# Patient Record
Sex: FEMALE | Race: WHITE | NOT HISPANIC OR LATINO | Employment: FULL TIME | URBAN - NONMETROPOLITAN AREA
[De-identification: names, ages, dates, MRNs, and addresses within clinical notes are randomized per-mention and may not be internally consistent; named-entity substitution may affect disease eponyms.]

---

## 2017-01-26 ENCOUNTER — OFFICE VISIT (OUTPATIENT)
Dept: CARDIOLOGY | Facility: CLINIC | Age: 47
End: 2017-01-26

## 2017-01-26 VITALS
BODY MASS INDEX: 37.28 KG/M2 | OXYGEN SATURATION: 95 % | DIASTOLIC BLOOD PRESSURE: 97 MMHG | SYSTOLIC BLOOD PRESSURE: 156 MMHG | WEIGHT: 231 LBS | HEART RATE: 87 BPM

## 2017-01-26 DIAGNOSIS — R00.2 PALPITATIONS: ICD-10-CM

## 2017-01-26 DIAGNOSIS — R53.83 OTHER FATIGUE: ICD-10-CM

## 2017-01-26 DIAGNOSIS — G47.9 SLEEP DISORDER: ICD-10-CM

## 2017-01-26 DIAGNOSIS — R06.02 SHORTNESS OF BREATH: Primary | ICD-10-CM

## 2017-01-26 PROCEDURE — 99213 OFFICE O/P EST LOW 20 MIN: CPT | Performed by: PHYSICIAN ASSISTANT

## 2017-01-26 RX ORDER — NEBIVOLOL 5 MG/1
5 TABLET ORAL DAILY
Qty: 30 TABLET | Refills: 6 | Status: SHIPPED | OUTPATIENT
Start: 2017-01-26 | End: 2017-04-03

## 2017-01-26 NOTE — PROGRESS NOTES
Problem list     Subjective   Kirstie Jackson is a 46 y.o. female     Chief Complaint   Patient presents with   • Follow-up   • Results       HPI    Problem List:     1. Nonobstructive CAD per catheterization, 2011.  1.1 Stress test 4/6/16 - indeterminate risk due to inadequate time on the treadmill; poor exercise capacity  1.2 low risk stress test October 2016  2. Mild peripheral vascular disease per arterial duplex of the lower extremities, January 2012.  3. Hypertension  4. Dyslipidemia  5. Chronic palpitations likely secondary to anxiety and stress.  5.1 Event Monitor 10/21-11/3 - NSR  6. Smoking Habituation    Patient is a 46-year-old female that presents back for follow-up. She describes having atypical chest discomfort. She has parasternal chest discomfort that is reproducible on palpation. She describes a sharp pain. She has mild dyspnea with exertion but nothing has been progressive. She denies PND orthopnea. She does for palpitations. Last office visit she had increase metoprolol which seemed to worsen her palpitations. Otherwise, she states she is doing well.    She does complain of sleep walking. She has been very concerned about the degree of her sleep HAS HISTORY OF SLEEP DISORDER. SHE ALSO COMPLAINS OF MUCH FATIGUE BUT OTHERWISE VOICES NO COMPLAINTS      Outpatient Encounter Prescriptions as of 1/26/2017   Medication Sig Dispense Refill   • aspirin 81 MG tablet Take 1 tablet by mouth daily.     • cyanocobalamin 1000 MCG/ML injection INJECT 1 ML INTRAMUSCULARLY ONCE A MONTH     • cyclobenzaprine (FLEXERIL) 10 MG tablet Take  by mouth as needed.     • fexofenadine (ALLEGRA ALLERGY) 180 MG tablet Take 1 tablet by mouth daily.     • gabapentin (NEURONTIN) 600 MG tablet Take 300 mg by mouth at night.  0   • HYDROcodone-acetaminophen (NORCO) 5-325 MG per tablet take 1 tablet by mouth three times a day  0   • hydrOXYzine (VISTARIL) 25 MG capsule 25 mg 4 (four) times a day as needed.  0   • lisinopril  (PRINIVIL,ZESTRIL) 5 MG tablet Take 1 tablet by mouth daily.     • nitroglycerin (NITROSTAT) 0.4 MG SL tablet Place  under the tongue. PLACE 1 TABLET UNDER THE TONGUE EVERY 5 MINUTES FOR UP TO 3 DOSES AS NEEDED FOR CHEST PAIN. CALL 911 IF PAIN PERSISTS.     • pravastatin (PRAVACHOL) 10 MG tablet Take 1 tablet by mouth every night.     • ranitidine (ZANTAC) 150 MG tablet Take 2 tablets by mouth every 12 (twelve) hours.     • zafirlukast (ACCOLATE) 10 MG tablet Take 1 tablet by mouth daily.     • nebivolol (BYSTOLIC) 5 MG tablet Take 1 tablet by mouth Daily. 30 tablet 6   • [DISCONTINUED] metoprolol tartrate (LOPRESSOR) 50 MG tablet Take 1 tablet by mouth 2 (two) times a day. (Patient taking differently: Take 25 mg by mouth 2 (Two) Times a Day.) 180 tablet 3     No facility-administered encounter medications on file as of 1/26/2017.        Amoxicillin and Codeine    Past Medical History   Diagnosis Date   • Anxiety    • Asthma    • Atypical chest pain    • CAD (coronary artery disease)      2-vessel   • Chronic headaches    • Dyslipidemia    • Dyspnea    • GERD (gastroesophageal reflux disease)    • H/O cardiac catheterization 2011   • H/O: osteoarthritis    • History of peripheral arterial disease    • Hyperlipidemia    • Hypertension    • Osteoarthritis    • Palpitations    • Peripheral artery disease    • Seasonal allergies    • Vitamin B12 deficiency (non anemic)        Social History     Social History   • Marital status:      Spouse name: N/A   • Number of children: N/A   • Years of education: N/A     Occupational History   • Not on file.     Social History Main Topics   • Smoking status: Former Smoker   • Smokeless tobacco: Not on file   • Alcohol use No   • Drug use: No   • Sexual activity: Not on file     Other Topics Concern   • Not on file     Social History Narrative       Family History   Problem Relation Age of Onset   • Heart attack Father    • Other Father      CABG   • Hypertension Father    •  Heart attack Paternal Grandfather        Review of Systems   Constitutional: Positive for fatigue.   HENT: Negative.    Eyes: Negative.    Respiratory: Positive for shortness of breath.    Cardiovascular: Positive for chest pain and palpitations.   Gastrointestinal: Negative.    Endocrine: Negative.    Musculoskeletal: Negative.    Skin: Negative.    Neurological: Negative.    Hematological: Negative.    Psychiatric/Behavioral: Negative.        Objective     Visit Vitals   • /97   • Pulse 87   • Wt 231 lb (105 kg)   • SpO2 95%   • BMI 37.28 kg/m2       Lab Results (most recent)     None          Physical Exam   Constitutional: She is oriented to person, place, and time. She appears well-developed and well-nourished. No distress.   HENT:   Head: Normocephalic and atraumatic.   Eyes: EOM are normal. Pupils are equal, round, and reactive to light.   Neck: No JVD present.   Cardiovascular: Normal rate, regular rhythm and normal heart sounds.  Exam reveals no gallop and no friction rub.    No murmur heard.  Pulmonary/Chest: Effort normal and breath sounds normal. No respiratory distress. She has no wheezes. She has no rales. She exhibits no tenderness.   Abdominal: Soft.   Musculoskeletal: Normal range of motion. She exhibits no edema.   Neurological: She is alert and oriented to person, place, and time. No cranial nerve deficit.   Skin: Skin is warm and dry. No rash noted. No erythema. No pallor.   Psychiatric: She has a normal mood and affect. Her behavior is normal.   Nursing note and vitals reviewed.      Procedure   Procedures       Assessment/Plan     Problems Addressed this Visit        Cardiovascular and Mediastinum    Palpitations    Relevant Orders    Polysomnography 4 or More Parameters       Respiratory    Shortness of breath - Primary    Relevant Orders    Polysomnography 4 or More Parameters       Other    Fatigue    Relevant Orders    Polysomnography 4 or More Parameters    Sleep disorder               Recommendations  1. Would like to change her metoprolol to Bystolic to see if we can help with her tachycardia palpitations. Event monitoring his been unremarkable up to this point.  2. Because of sleep apnea and fatigue, I feel a sleep study would be appropriate. We will schedule for the same and consider referral point follow-up pending results. Nexium 3. We will see her back for follow-up after testing. Follow-up primary as scheduled

## 2017-01-26 NOTE — LETTER
January 26, 2017     Gonzalo Berg MD  [address] [facility] [address]    Patient: Kirstie Jackson   YOB: 1970   Date of Visit: 1/26/2017       Dear Dr. Otis MD:    Kirstie Jackson was in my office today. Below is a copy of my note.    If you have questions, please do not hesitate to call me. I look forward to following Kirstie along with you.         Sincerely,        ERNESTINE Wisdom        CC: No Recipients    Problem list     Subjective   Kirstie Jackson is a 46 y.o. female     Chief Complaint   Patient presents with   • Follow-up   • Results       HPI    Problem List:     1. Nonobstructive CAD per catheterization, 2011.  1.1 Stress test 4/6/16 - indeterminate risk due to inadequate time on the treadmill; poor exercise capacity  1.2 low risk stress test October 2016  2. Mild peripheral vascular disease per arterial duplex of the lower extremities, January 2012.  3. Hypertension  4. Dyslipidemia  5. Chronic palpitations likely secondary to anxiety and stress.  5.1 Event Monitor 10/21-11/3 - NSR  6. Smoking Habituation    Patient is a 46-year-old female that presents back for follow-up. She describes having atypical chest discomfort. She has parasternal chest discomfort that is reproducible on palpation. She describes a sharp pain. She has mild dyspnea with exertion but nothing has been progressive. She denies PND orthopnea. She does for palpitations. Last office visit she had increase metoprolol which seemed to worsen her palpitations. Otherwise, she states she is doing well.    She does complain of sleep walking. She has been very concerned about the degree of her sleep HAS HISTORY OF SLEEP DISORDER. SHE ALSO COMPLAINS OF MUCH FATIGUE BUT OTHERWISE VOICES NO COMPLAINTS      Outpatient Encounter Prescriptions as of 1/26/2017   Medication Sig Dispense Refill   • aspirin 81 MG tablet Take 1 tablet by mouth daily.     • cyanocobalamin 1000 MCG/ML injection INJECT 1 ML INTRAMUSCULARLY ONCE  A MONTH     • cyclobenzaprine (FLEXERIL) 10 MG tablet Take  by mouth as needed.     • fexofenadine (ALLEGRA ALLERGY) 180 MG tablet Take 1 tablet by mouth daily.     • gabapentin (NEURONTIN) 600 MG tablet Take 300 mg by mouth at night.  0   • HYDROcodone-acetaminophen (NORCO) 5-325 MG per tablet take 1 tablet by mouth three times a day  0   • hydrOXYzine (VISTARIL) 25 MG capsule 25 mg 4 (four) times a day as needed.  0   • lisinopril (PRINIVIL,ZESTRIL) 5 MG tablet Take 1 tablet by mouth daily.     • nitroglycerin (NITROSTAT) 0.4 MG SL tablet Place  under the tongue. PLACE 1 TABLET UNDER THE TONGUE EVERY 5 MINUTES FOR UP TO 3 DOSES AS NEEDED FOR CHEST PAIN. CALL 911 IF PAIN PERSISTS.     • pravastatin (PRAVACHOL) 10 MG tablet Take 1 tablet by mouth every night.     • ranitidine (ZANTAC) 150 MG tablet Take 2 tablets by mouth every 12 (twelve) hours.     • zafirlukast (ACCOLATE) 10 MG tablet Take 1 tablet by mouth daily.     • nebivolol (BYSTOLIC) 5 MG tablet Take 1 tablet by mouth Daily. 30 tablet 6   • [DISCONTINUED] metoprolol tartrate (LOPRESSOR) 50 MG tablet Take 1 tablet by mouth 2 (two) times a day. (Patient taking differently: Take 25 mg by mouth 2 (Two) Times a Day.) 180 tablet 3     No facility-administered encounter medications on file as of 1/26/2017.        Amoxicillin and Codeine    Past Medical History   Diagnosis Date   • Anxiety    • Asthma    • Atypical chest pain    • CAD (coronary artery disease)      2-vessel   • Chronic headaches    • Dyslipidemia    • Dyspnea    • GERD (gastroesophageal reflux disease)    • H/O cardiac catheterization 2011   • H/O: osteoarthritis    • History of peripheral arterial disease    • Hyperlipidemia    • Hypertension    • Osteoarthritis    • Palpitations    • Peripheral artery disease    • Seasonal allergies    • Vitamin B12 deficiency (non anemic)        Social History     Social History   • Marital status:      Spouse name: N/A   • Number of children: N/A   •  Years of education: N/A     Occupational History   • Not on file.     Social History Main Topics   • Smoking status: Former Smoker   • Smokeless tobacco: Not on file   • Alcohol use No   • Drug use: No   • Sexual activity: Not on file     Other Topics Concern   • Not on file     Social History Narrative       Family History   Problem Relation Age of Onset   • Heart attack Father    • Other Father      CABG   • Hypertension Father    • Heart attack Paternal Grandfather        Review of Systems   Constitutional: Positive for fatigue.   HENT: Negative.    Eyes: Negative.    Respiratory: Positive for shortness of breath.    Cardiovascular: Positive for chest pain and palpitations.   Gastrointestinal: Negative.    Endocrine: Negative.    Musculoskeletal: Negative.    Skin: Negative.    Neurological: Negative.    Hematological: Negative.    Psychiatric/Behavioral: Negative.        Objective     Visit Vitals   • /97   • Pulse 87   • Wt 231 lb (105 kg)   • SpO2 95%   • BMI 37.28 kg/m2       Lab Results (most recent)     None          Physical Exam   Constitutional: She is oriented to person, place, and time. She appears well-developed and well-nourished. No distress.   HENT:   Head: Normocephalic and atraumatic.   Eyes: EOM are normal. Pupils are equal, round, and reactive to light.   Neck: No JVD present.   Cardiovascular: Normal rate, regular rhythm and normal heart sounds.  Exam reveals no gallop and no friction rub.    No murmur heard.  Pulmonary/Chest: Effort normal and breath sounds normal. No respiratory distress. She has no wheezes. She has no rales. She exhibits no tenderness.   Abdominal: Soft.   Musculoskeletal: Normal range of motion. She exhibits no edema.   Neurological: She is alert and oriented to person, place, and time. No cranial nerve deficit.   Skin: Skin is warm and dry. No rash noted. No erythema. No pallor.   Psychiatric: She has a normal mood and affect. Her behavior is normal.   Nursing note  and vitals reviewed.      Procedure   Procedures       Assessment/Plan     Problems Addressed this Visit        Cardiovascular and Mediastinum    Palpitations    Relevant Orders    Polysomnography 4 or More Parameters       Respiratory    Shortness of breath - Primary    Relevant Orders    Polysomnography 4 or More Parameters       Other    Fatigue    Relevant Orders    Polysomnography 4 or More Parameters    Sleep disorder              Recommendations  1. Would like to change her metoprolol to Bystolic to see if we can help with her tachycardia palpitations. Event monitoring his been unremarkable up to this point.  2. Because of sleep apnea and fatigue, I feel a sleep study would be appropriate. We will schedule for the same and consider referral point follow-up pending results. Nexium 3. We will see her back for follow-up after testing. Follow-up primary as scheduled

## 2017-01-26 NOTE — PATIENT INSTRUCTIONS
Sleep Studies  A sleep study (polysomnogram) is a series of tests done while you are sleeping. It can show how well you sleep. This can help your health care provider diagnose a sleep disorder and show how severe your sleep disorder is. A sleep study may lead to treatment that will help you sleep better and prevent other medical problems caused by poor sleep.  If you have a sleep disorder, you may also be at risk for:   · Sleep-related accidents.  · High blood pressure.  · Heart disease.  · Stroke.  · Other medical conditions.  Sleep disorders are common. Your health care provider may suspect a sleep disorder if you:  · Have loud snoring most nights.  · Have brief periods when you stop breathing at night.  · Feel sleepy on most days.  · Fall asleep suddenly during the day.  · Have trouble falling asleep or staying asleep.  · Feel like you need to move your legs when trying to fall asleep.  · Have dreams that seem very real shortly after falling asleep.  · Feel like you cannot move when you first wake up.  WHICH TESTS WILL I NEED TO HAVE?   Most sleep studies last all night and include these tests:   · Recordings of your brain activity.  · Recordings of your eye movements.  · Recording of your heart rate and rhythm.  · Blood pressure readings.  · Readings of the amount of oxygen in your blood.  · Measurements of your chest and belly movement as you breathe during sleep.  If you have signs of the sleep disorder called sleep apnea during your test, you may get a mask to wear for the second half of the night.   · The mask provides continuous positive airway pressure (CPAP). This may improve sleep apnea significantly.  · You will then have all tests done again with the mask in place to see if your measurements and recordings change.  HOW ARE SLEEP STUDIES DONE?  Most sleep studies are done over one full night of sleep.   · You will arrive at the study center in the evening and can go home in the morning.  · Bring your  pajamas and toothbrush.  · Do not have caffeine on the day of your sleep study.  · Your health care provider will let you know if you need to stop taking any of your regular medicines before the test.  To do the tests included in a polysomnogram, you will have:  · Round, sticky patches with sensors attached to recording wires (electrodes) placed on your scalp, face, chest, and limbs.  · Wires from all the electrodes and sensors run from your bed to a computer. The wires can be taken off and put back on if you need to get out of bed to go to the bathroom.  · A sensor placed over your nose to measure airflow.  · A finger clip put on one finger to measure your blood oxygen level.  · A belt around your belly and a belt around your chest to measure breathing movements.  WHERE ARE SLEEP STUDIES DONE?   Sleep studies are done at sleep centers. A sleep center may be inside a hospital, office, or clinic.   The room where you have the study may look like a hospital room or a hotel room. The health care providers doing the study may come in and out of the room during the study. Most of the time, they will be in another room monitoring your test.   HOW IS INFORMATION FROM SLEEP STUDIES HELPFUL?  A polysomnogram can be used along with your medical history and a physical exam to diagnose conditions, such as:  · Sleep apnea.  · Restless legs syndrome.  · Sleep-related seizure disorders.  · Sleep-related movement disorders.  A medical doctor who specializes in sleep will evaluate your sleep study. The specialist will share the results with your primary health care provider. Treatments based on your sleep study may include:  · Improving your sleep habits (sleep hygiene).  · Wearing a CPAP mask.  · Wearing an oral device at night to improve breathing and reduce snoring.  · Taking medicine for:    Restless legs syndrome.    Sleep-related seizure disorder.    Sleep-related movement disorder.     This information is not intended to  replace advice given to you by your health care provider. Make sure you discuss any questions you have with your health care provider.     Document Released: 06/23/2004 Document Revised: 01/08/2016 Document Reviewed: 02/23/2015  [employer] Patient Education ©2016 [employer]

## 2017-01-26 NOTE — MR AVS SNAPSHOT
Kirstie Jackson   1/26/2017 9:00 AM   Office Visit    Dept Phone:  [phone]   Encounter #:  [id number]    Provider:  ERNESTINE Middleton   Department:  [facility] CARDIOLOGY                Your Full Care Plan              Today's Medication Changes          These changes are accurate as of: 1/26/17  9:34 AM.  If you have any questions, ask your nurse or doctor.               New Medication(s)Ordered:     nebivolol 5 MG tablet   Commonly known as:  BYSTOLIC   Take 1 tablet by mouth Daily.   Started by:  ERNESTINE Middleton         Stop taking medication(s)listed here:     metoprolol tartrate 50 MG tablet   Commonly known as:  LOPRESSOR   Stopped by:  ERNESTINE Middleton                Where to Get Your Medications      These medications were sent to [facility]/pharmacy #[id number] - [place], [place] - [address] [address] - [phone]  - [phone]   [address] [address]     Phone:  [phone]     nebivolol 5 MG tablet                  Your Updated Medication List          This list is accurate as of: 1/26/17  9:34 AM.  Always use your most recent med list.                ACCOLATE 10 MG tablet   Generic drug:  zafirlukast       ALLEGRA ALLERGY 180 MG tablet   Generic drug:  fexofenadine       aspirin 81 MG tablet       cyanocobalamin 1000 MCG/ML injection       FLEXERIL 10 MG tablet   Generic drug:  cyclobenzaprine       gabapentin 600 MG tablet   Commonly known as:  NEURONTIN       HYDROcodone-acetaminophen 5-325 MG per tablet   Commonly known as:  NORCO       hydrOXYzine 25 MG capsule   Commonly known as:  VISTARIL       lisinopril 5 MG tablet   Commonly known as:  PRINIVIL,ZESTRIL       nebivolol 5 MG tablet   Commonly known as:  BYSTOLIC   Take 1 tablet by mouth Daily.       NITROSTAT 0.4 MG SL tablet   Generic drug:  nitroglycerin       pravastatin 10 MG tablet   Commonly known as:  PRAVACHOL       raNITIdine 150 MG tablet   Commonly known as:  ZANTAC                You Were Diagnosed With        Codes Comments    Shortness of breath    -  Primary ICD-10-CM: R06.02  ICD-9-CM: 786.05     Palpitations     ICD-10-CM: R00.2  ICD-9-CM: 785.1     Other fatigue     ICD-10-CM: R53.83  ICD-9-CM: 780.79       Instructions    Sleep Studies  A sleep study (polysomnogram) is a series of tests done while you are sleeping. It can show how well you sleep. This can help your health care provider diagnose a sleep disorder and show how severe your sleep disorder is. A sleep study may lead to treatment that will help you sleep better and prevent other medical problems caused by poor sleep.  If you have a sleep disorder, you may also be at risk for:   · Sleep-related accidents.  · High blood pressure.  · Heart disease.  · Stroke.  · Other medical conditions.  Sleep disorders are common. Your health care provider may suspect a sleep disorder if you:  · Have loud snoring most nights.  · Have brief periods when you stop breathing at night.  · Feel sleepy on most days.  · Fall asleep suddenly during the day.  · Have trouble falling asleep or staying asleep.  · Feel like you need to move your legs when trying to fall asleep.  · Have dreams that seem very real shortly after falling asleep.  · Feel like you cannot move when you first wake up.  WHICH TESTS WILL I NEED TO HAVE?   Most sleep studies last all night and include these tests:   · Recordings of your brain activity.  · Recordings of your eye movements.  · Recording of your heart rate and rhythm.  · Blood pressure readings.  · Readings of the amount of oxygen in your blood.  · Measurements of your chest and belly movement as you breathe during sleep.  If you have signs of the sleep disorder called sleep apnea during your test, you may get a mask to wear for the second half of the night.   · The mask provides continuous positive airway pressure (CPAP). This may improve sleep apnea significantly.  · You will then have all tests done  again with the mask in place to see if your measurements and recordings change.  HOW ARE SLEEP STUDIES DONE?  Most sleep studies are done over one full night of sleep.   · You will arrive at the study center in the evening and can go home in the morning.  · Bring your pajamas and toothbrush.  · Do not have caffeine on the day of your sleep study.  · Your health care provider will let you know if you need to stop taking any of your regular medicines before the test.  To do the tests included in a polysomnogram, you will have:  · Round, sticky patches with sensors attached to recording wires (electrodes) placed on your scalp, face, chest, and limbs.  · Wires from all the electrodes and sensors run from your bed to a computer. The wires can be taken off and put back on if you need to get out of bed to go to the bathroom.  · A sensor placed over your nose to measure airflow.  · A finger clip put on one finger to measure your blood oxygen level.  · A belt around your belly and a belt around your chest to measure breathing movements.  WHERE ARE SLEEP STUDIES DONE?   Sleep studies are done at sleep centers. A sleep center may be inside a hospital, office, or clinic.   The room where you have the study may look like a hospital room or a hotel room. The health care providers doing the study may come in and out of the room during the study. Most of the time, they will be in another room monitoring your test.   HOW IS INFORMATION FROM SLEEP STUDIES HELPFUL?  A polysomnogram can be used along with your medical history and a physical exam to diagnose conditions, such as:  · Sleep apnea.  · Restless legs syndrome.  · Sleep-related seizure disorders.  · Sleep-related movement disorders.  A medical doctor who specializes in sleep will evaluate your sleep study. The specialist will share the results with your primary health care provider. Treatments based on your sleep study may include:  · Improving your sleep habits (sleep  hygiene).  · Wearing a CPAP mask.  · Wearing an oral device at night to improve breathing and reduce snoring.  · Taking medicine for:    Restless legs syndrome.    Sleep-related seizure disorder.    Sleep-related movement disorder.     This information is not intended to replace advice given to you by your health care provider. Make sure you discuss any questions you have with your health care provider.     Document Released: 2004 Document Revised: 2016 Document Reviewed: 2015  Elsevier Interactive Patient Education  [employer]       Patient Instructions History      Upcoming Appointments     Visit Type Date Time Department    FOLLOW UP 2017  9:00 AM [facility]      "[employer] Signup     [facility] "[employer]" allows you to send messages to your doctor, view your test results, renew your prescriptions, schedule appointments, and more. To sign up, go to [employer] and click on the Sign Up Now link in the New User? box. Enter your "[employer]" Activation Code exactly as it appears below along with the last four digits of your Social Security Number and your Date of Birth () to complete the sign-up process. If you do not sign up before the expiration date, you must request a new code.    "[employer]" Activation Code: [email or web address]  Expires: 2017  9:34 AM    If you have questions, you can email [email or web address] or call [phone] to talk to our "[employer]" staff. Remember, "[employer]" is NOT to be used for urgent needs. For medical emergencies, dial 911.               Other Info from Your Visit           Allergies     Amoxicillin      Codeine        Reason for Visit     Follow-up     Results           Vital Signs     Blood Pressure Pulse Weight Oxygen Saturation Body Mass Index Smoking Status    156/97 87 231 lb (105 kg) 95% 37.28 kg/m2 Former Smoker      Problems and Diagnoses Noted     Tiredness    Palpitations    Shortness of breath

## 2017-04-03 ENCOUNTER — OFFICE VISIT (OUTPATIENT)
Dept: CARDIOLOGY | Facility: CLINIC | Age: 47
End: 2017-04-03

## 2017-04-03 VITALS
OXYGEN SATURATION: 95 % | DIASTOLIC BLOOD PRESSURE: 89 MMHG | WEIGHT: 234 LBS | HEART RATE: 68 BPM | SYSTOLIC BLOOD PRESSURE: 138 MMHG | HEIGHT: 66 IN | BODY MASS INDEX: 37.61 KG/M2

## 2017-04-03 DIAGNOSIS — R06.00 DYSPNEA, UNSPECIFIED TYPE: Primary | ICD-10-CM

## 2017-04-03 DIAGNOSIS — R06.81 APNEA: ICD-10-CM

## 2017-04-03 DIAGNOSIS — I10 ESSENTIAL HYPERTENSION: ICD-10-CM

## 2017-04-03 DIAGNOSIS — R53.83 OTHER FATIGUE: ICD-10-CM

## 2017-04-03 DIAGNOSIS — R07.9 CHEST PAIN, UNSPECIFIED TYPE: ICD-10-CM

## 2017-04-03 PROCEDURE — 99213 OFFICE O/P EST LOW 20 MIN: CPT | Performed by: PHYSICIAN ASSISTANT

## 2017-04-03 RX ORDER — ZAFIRLUKAST 20 MG/1
TABLET, FILM COATED ORAL 2 TIMES DAILY
Refills: 0 | COMMUNITY
Start: 2017-03-21 | End: 2019-06-10

## 2017-04-03 RX ORDER — HYDROCODONE BITARTRATE AND ACETAMINOPHEN 10; 325 MG/1; MG/1
TABLET ORAL
COMMUNITY
Start: 2017-03-31

## 2017-04-03 RX ORDER — RANITIDINE 300 MG/1
TABLET ORAL 2 TIMES DAILY
Refills: 0 | COMMUNITY
Start: 2017-03-22 | End: 2020-06-11

## 2017-04-03 RX ORDER — METOPROLOL TARTRATE 50 MG/1
TABLET, FILM COATED ORAL 2 TIMES DAILY
Refills: 3 | COMMUNITY
Start: 2017-02-26 | End: 2017-08-21 | Stop reason: SDUPTHER

## 2017-04-03 RX ORDER — PRAVASTATIN SODIUM 20 MG
20 TABLET ORAL DAILY
Qty: 30 TABLET | Refills: 11 | Status: SHIPPED | OUTPATIENT
Start: 2017-04-03 | End: 2017-05-30 | Stop reason: SDUPTHER

## 2017-04-03 NOTE — PROGRESS NOTES
Problem list     Subjective   Kirstie Jackson is a 46 y.o. female     Chief Complaint   Patient presents with   • Follow-up       HPI    Problem List:      1. Nonobstructive CAD per catheterization, 2011.  1.1 Stress test 4/6/16 - indeterminate risk due to inadequate time on the treadmill; poor exercise capacity  1.2 low risk stress test October 2016  2. Mild peripheral vascular disease per arterial duplex of the lower extremities, January 2012.  3. Hypertension  4. Dyslipidemia  5. Chronic palpitations likely secondary to anxiety and stress.  5.1 Event Monitor 10/21-11/3 - NSR  6. Smoking Habituation    Patient is a 46-year-old female that presents back for follow-up. She states she has been doing well. Her chest discomfort his prior with no progression of her discomfort. It is transient in nature described as a few seconds and then resolving. She has mild dyspnea at baseline but this has not progressed. She denies PND orthopnea. Palpitations are stable on metoprolol as she could not take by systolic. She has no dizziness presyncope or syncope and otherwise feels well      Outpatient Encounter Prescriptions as of 4/3/2017   Medication Sig Dispense Refill   • cyanocobalamin 1000 MCG/ML injection INJECT 1 ML INTRAMUSCULARLY ONCE A MONTH     • cyclobenzaprine (FLEXERIL) 10 MG tablet Take  by mouth as needed.     • fexofenadine (ALLEGRA ALLERGY) 180 MG tablet Take 1 tablet by mouth daily.     • gabapentin (NEURONTIN) 600 MG tablet Take 300 mg by mouth at night.  0   • HYDROcodone-acetaminophen (NORCO) 7.5-325 MG per tablet prn     • hydrOXYzine (VISTARIL) 25 MG capsule 25 mg 4 (four) times a day as needed.  0   • metoprolol tartrate (LOPRESSOR) 50 MG tablet 2 (Two) Times a Day.  3   • raNITIdine (ZANTAC) 300 MG tablet 2 (Two) Times a Day.  0   • zafirlukast (ACCOLATE) 20 MG tablet 2 (Two) Times a Day.  0   • [DISCONTINUED] aspirin 81 MG tablet Take 1 tablet by mouth daily.     • [DISCONTINUED] lisinopril  (PRINIVIL,ZESTRIL) 5 MG tablet Take 1 tablet by mouth daily.     • [DISCONTINUED] zafirlukast (ACCOLATE) 10 MG tablet Take 1 tablet by mouth daily.     • nitroglycerin (NITROSTAT) 0.4 MG SL tablet Place  under the tongue. PLACE 1 TABLET UNDER THE TONGUE EVERY 5 MINUTES FOR UP TO 3 DOSES AS NEEDED FOR CHEST PAIN. CALL 911 IF PAIN PERSISTS.     • pravastatin (PRAVACHOL) 20 MG tablet Take 1 tablet by mouth Daily. 30 tablet 11   • [DISCONTINUED] HYDROcodone-acetaminophen (NORCO) 5-325 MG per tablet take 1 tablet by mouth three times a day  0   • [DISCONTINUED] nebivolol (BYSTOLIC) 5 MG tablet Take 1 tablet by mouth Daily. 30 tablet 6   • [DISCONTINUED] pravastatin (PRAVACHOL) 10 MG tablet Take 1 tablet by mouth every night.     • [DISCONTINUED] ranitidine (ZANTAC) 150 MG tablet Take 2 tablets by mouth every 12 (twelve) hours.       No facility-administered encounter medications on file as of 4/3/2017.        Amoxicillin and Codeine    Past Medical History:   Diagnosis Date   • Anxiety    • Asthma    • Atypical chest pain    • CAD (coronary artery disease)     2-vessel   • Chronic headaches    • Dyslipidemia    • Dyspnea    • GERD (gastroesophageal reflux disease)    • H/O cardiac catheterization 2011   • H/O: osteoarthritis    • History of peripheral arterial disease    • Hyperlipidemia    • Hypertension    • Osteoarthritis    • Palpitations    • Peripheral artery disease    • Seasonal allergies    • Vitamin B12 deficiency (non anemic)        Social History     Social History   • Marital status:      Spouse name: N/A   • Number of children: N/A   • Years of education: N/A     Occupational History   • Not on file.     Social History Main Topics   • Smoking status: Former Smoker   • Smokeless tobacco: Not on file   • Alcohol use No   • Drug use: No   • Sexual activity: Not on file     Other Topics Concern   • Not on file     Social History Narrative       Family History   Problem Relation Age of Onset   • Heart  "attack Father    • Other Father      CABG   • Hypertension Father    • Heart attack Paternal Grandfather        Review of Systems   Constitutional: Positive for fatigue.   HENT: Positive for congestion (nasal).    Eyes: Positive for visual disturbance (reading glasses).   Respiratory: Positive for shortness of breath (usual).    Cardiovascular: Positive for chest pain, palpitations and leg swelling (occas.).   Gastrointestinal: Negative.    Endocrine: Negative.    Genitourinary: Negative.    Musculoskeletal: Positive for arthralgias and myalgias.   Skin: Negative.    Allergic/Immunologic: Positive for environmental allergies (seasonal).   Neurological: Positive for dizziness and light-headedness.   Hematological: Negative.    Psychiatric/Behavioral: Positive for sleep disturbance.       Objective     /89 (BP Location: Left arm, Patient Position: Sitting)  Pulse 68  Ht 66\" (167.6 cm)  Wt 234 lb (106 kg)  SpO2 95%  BMI 37.77 kg/m2    Lab Results (most recent)     None          Physical Exam   Constitutional: She is oriented to person, place, and time. She appears well-developed and well-nourished. No distress.   HENT:   Head: Normocephalic and atraumatic.   Eyes: EOM are normal. Pupils are equal, round, and reactive to light.   Neck: No JVD present.   Cardiovascular: Normal rate, regular rhythm and normal heart sounds.  Exam reveals no gallop and no friction rub.    No murmur heard.  Pulmonary/Chest: Effort normal and breath sounds normal. No respiratory distress. She has no wheezes. She has no rales. She exhibits no tenderness.   Abdominal: Soft. She exhibits no distension. There is no tenderness.   Musculoskeletal: Normal range of motion. She exhibits no edema.   Neurological: She is alert and oriented to person, place, and time. No cranial nerve deficit.   Skin: Skin is warm and dry. No rash noted. No erythema. No pallor.   Psychiatric: She has a normal mood and affect. Her behavior is normal.   Nursing " note and vitals reviewed.      Procedure   Procedures       Assessment/Plan     Problems Addressed this Visit        Cardiovascular and Mediastinum    Essential hypertension    Relevant Medications    metoprolol tartrate (LOPRESSOR) 50 MG tablet    Other Relevant Orders    Overnight Sleep Oximetry Study       Respiratory    Dyspnea - Primary    Relevant Orders    Overnight Sleep Oximetry Study    Apnea    Relevant Orders    Overnight Sleep Oximetry Study       Nervous and Auditory    Chest pain    Relevant Orders    Overnight Sleep Oximetry Study       Other    Fatigue    Relevant Orders    Overnight Sleep Oximetry Study            Recommendations  1. Because of patient's fatigue and shortness of breath, would like to schedule for overnight sleep oximetry study. Otherwise chest pain is very atypical.    Palpitations are controlled on current medical therapy. Recent evaluation of regards to ischemia is been normal. She wants no further evaluation. If symptoms worsen she will contact our office. We will see her back follow-up in 6 months. Follow-up with primary as scheduled

## 2017-05-30 RX ORDER — PRAVASTATIN SODIUM 20 MG
20 TABLET ORAL DAILY
Qty: 90 TABLET | Refills: 3 | Status: SHIPPED | OUTPATIENT
Start: 2017-05-30 | End: 2020-06-11

## 2017-08-21 RX ORDER — METOPROLOL TARTRATE 50 MG/1
50 TABLET, FILM COATED ORAL 2 TIMES DAILY
Qty: 180 TABLET | Refills: 3 | Status: SHIPPED | OUTPATIENT
Start: 2017-08-21 | End: 2018-08-17 | Stop reason: SDUPTHER

## 2018-03-20 ENCOUNTER — APPOINTMENT (OUTPATIENT)
Dept: WOMENS IMAGING | Facility: HOSPITAL | Age: 48
End: 2018-03-20

## 2018-03-20 PROCEDURE — 77067 SCR MAMMO BI INCL CAD: CPT | Performed by: RADIOLOGY

## 2018-03-20 PROCEDURE — 77063 BREAST TOMOSYNTHESIS BI: CPT | Performed by: RADIOLOGY

## 2018-08-17 RX ORDER — METOPROLOL TARTRATE 50 MG/1
50 TABLET, FILM COATED ORAL EVERY 12 HOURS SCHEDULED
Qty: 60 TABLET | Refills: 0 | Status: SHIPPED | OUTPATIENT
Start: 2018-08-17 | End: 2018-08-20 | Stop reason: SDUPTHER

## 2018-08-20 DIAGNOSIS — I10 ESSENTIAL HYPERTENSION: Primary | ICD-10-CM

## 2018-08-20 RX ORDER — METOPROLOL TARTRATE 50 MG/1
50 TABLET, FILM COATED ORAL EVERY 12 HOURS SCHEDULED
Qty: 60 TABLET | Refills: 0 | Status: SHIPPED | OUTPATIENT
Start: 2018-08-20 | End: 2018-09-19 | Stop reason: SDUPTHER

## 2018-09-19 DIAGNOSIS — I10 ESSENTIAL HYPERTENSION: ICD-10-CM

## 2018-09-19 RX ORDER — METOPROLOL TARTRATE 50 MG/1
50 TABLET, FILM COATED ORAL EVERY 12 HOURS SCHEDULED
Qty: 60 TABLET | Refills: 0 | Status: SHIPPED | OUTPATIENT
Start: 2018-09-19 | End: 2018-09-19 | Stop reason: SDUPTHER

## 2018-09-19 RX ORDER — METOPROLOL TARTRATE 50 MG/1
50 TABLET, FILM COATED ORAL EVERY 12 HOURS SCHEDULED
Qty: 180 TABLET | Refills: 2 | Status: SHIPPED | OUTPATIENT
Start: 2018-09-19

## 2018-09-19 NOTE — TELEPHONE ENCOUNTER
Advised patient 1 month refill to be sent in however she would have to be seen in office for any future refills as she hasn't been seen since 4/2017. Patient scheduled 10/8/18. FARHAT Conteh

## 2019-06-10 ENCOUNTER — OFFICE VISIT (OUTPATIENT)
Dept: CARDIOLOGY | Facility: CLINIC | Age: 49
End: 2019-06-10

## 2019-06-10 VITALS
SYSTOLIC BLOOD PRESSURE: 143 MMHG | BODY MASS INDEX: 35.36 KG/M2 | DIASTOLIC BLOOD PRESSURE: 77 MMHG | OXYGEN SATURATION: 96 % | HEIGHT: 66 IN | WEIGHT: 220 LBS | HEART RATE: 70 BPM

## 2019-06-10 DIAGNOSIS — R07.9 CHEST PAIN, UNSPECIFIED TYPE: ICD-10-CM

## 2019-06-10 DIAGNOSIS — R00.2 PALPITATIONS: Primary | ICD-10-CM

## 2019-06-10 DIAGNOSIS — R06.02 SHORTNESS OF BREATH: ICD-10-CM

## 2019-06-10 PROCEDURE — 93000 ELECTROCARDIOGRAM COMPLETE: CPT | Performed by: PHYSICIAN ASSISTANT

## 2019-06-10 PROCEDURE — 99213 OFFICE O/P EST LOW 20 MIN: CPT | Performed by: PHYSICIAN ASSISTANT

## 2019-06-10 RX ORDER — FOLIC ACID 1 MG/1
1 TABLET ORAL 2 TIMES DAILY
COMMUNITY
End: 2020-06-11

## 2019-06-10 RX ORDER — TIZANIDINE 4 MG/1
4 TABLET ORAL NIGHTLY PRN
COMMUNITY
End: 2020-06-11

## 2019-06-10 RX ORDER — MONTELUKAST SODIUM 10 MG/1
10 TABLET ORAL NIGHTLY
COMMUNITY

## 2019-06-10 NOTE — PATIENT INSTRUCTIONS
Steps to Quit Smoking  Smoking tobacco can be bad for your health. It can also affect almost every organ in your body. Smoking puts you and people around you at risk for many serious long-lasting (chronic) diseases. Quitting smoking is hard, but it is one of the best things that you can do for your health. It is never too late to quit.  What are the benefits of quitting smoking?  When you quit smoking, you lower your risk for getting serious diseases and conditions. They can include:  · Lung cancer or lung disease.  · Heart disease.  · Stroke.  · Heart attack.  · Not being able to have children (infertility).  · Weak bones (osteoporosis) and broken bones (fractures).    If you have coughing, wheezing, and shortness of breath, those symptoms may get better when you quit. You may also get sick less often. If you are pregnant, quitting smoking can help to lower your chances of having a baby of low birth weight.  What can I do to help me quit smoking?  Talk with your doctor about what can help you quit smoking. Some things you can do (strategies) include:  · Quitting smoking totally, instead of slowly cutting back how much you smoke over a period of time.  · Going to in-person counseling. You are more likely to quit if you go to many counseling sessions.  · Using resources and support systems, such as:  ? Online chats with a counselor.  ? Phone quitlines.  ? Printed self-help materials.  ? Support groups or group counseling.  ? Text messaging programs.  ? Mobile phone apps or applications.  · Taking medicines. Some of these medicines may have nicotine in them. If you are pregnant or breastfeeding, do not take any medicines to quit smoking unless your doctor says it is okay. Talk with your doctor about counseling or other things that can help you.    Talk with your doctor about using more than one strategy at the same time, such as taking medicines while you are also going to in-person counseling. This can help make  quitting easier.  What things can I do to make it easier to quit?  Quitting smoking might feel very hard at first, but there is a lot that you can do to make it easier. Take these steps:  · Talk to your family and friends. Ask them to support and encourage you.  · Call phone quitlines, reach out to support groups, or work with a counselor.  · Ask people who smoke to not smoke around you.  · Avoid places that make you want (trigger) to smoke, such as:  ? Bars.  ? Parties.  ? Smoke-break areas at work.  · Spend time with people who do not smoke.  · Lower the stress in your life. Stress can make you want to smoke. Try these things to help your stress:  ? Getting regular exercise.  ? Deep-breathing exercises.  ? Yoga.  ? Meditating.  ? Doing a body scan. To do this, close your eyes, focus on one area of your body at a time from head to toe, and notice which parts of your body are tense. Try to relax the muscles in those areas.  · Download or buy apps on your mobile phone or tablet that can help you stick to your quit plan. There are many free apps, such as QuitGuide from the CDC (Centers for Disease Control and Prevention). You can find more support from smokefree.gov and other websites.    This information is not intended to replace advice given to you by your health care provider. Make sure you discuss any questions you have with your health care provider.  Document Released: 10/14/2010 Document Revised: 08/15/2017 Document Reviewed: 05/03/2016  [employer] Interactive Patient Education © 2019 [employer]  Fat and Cholesterol Restricted Eating Plan  Getting too much fat and cholesterol in your diet may cause health problems. Choosing the right foods helps keep your fat and cholesterol at normal levels. This can keep you from getting certain diseases.  Your doctor may recommend an eating plan that includes:  · Total fat: ______% or less of total calories a day.  · Saturated fat: ______% or less of total calories a  "day.  · Cholesterol: less than _________mg a day.  · Fiber: ______g a day.    What are tips for following this plan?  General tips  · Work with your doctor to lose weight if you need to.  · Avoid:  ? Foods with added sugar.  ? Fried foods.  ? Foods with partially hydrogenated oils.  · Limit alcohol intake to no more than 1 drink a day for nonpregnant women and 2 drinks a day for men. One drink equals 12 oz of beer, 5 oz of wine, or 1½ oz of hard liquor.  Reading food labels  · Check food labels for:  ? Trans fats.  ? Partially hydrogenated oils.  ? Saturated fat (g) in each serving.  ? Cholesterol (mg) in each serving.  ? Fiber (g) in each serving.  · Choose foods with healthy fats, such as:  ? Monounsaturated fats.  ? Polyunsaturated fats.  ? Omega-3 fats.  · Choose grain products that have whole grains. Look for the word \"whole\" as the first word in the ingredient list.  Cooking  · Cook foods using low-fat methods. These include baking, boiling, grilling, and broiling.  · Eat more home-cooked foods. Eat at restaurants and buffets less often.  · Avoid cooking using saturated fats, such as butter, cream, palm oil, palm kernel oil, and coconut oil.  Meal planning  · At meals, divide your plate into four equal parts:  ? Fill one-half of your plate with vegetables and green salads.  ? Fill one-fourth of your plate with whole grains.  ? Fill one-fourth of your plate with low-fat (lean) protein foods.  · Eat fish that is high in omega-3 fats at least two times a week. This includes mackerel, tuna, sardines, and salmon.  · Eat foods that are high in fiber, such as whole grains, beans, apples, broccoli, carrots, peas, and barley.  Recommended foods  Grains  · Whole grains, such as whole wheat or whole grain breads, crackers, cereals, and pasta. Unsweetened oatmeal, bulgur, barley, quinoa, or brown rice. Corn or whole wheat flour tortillas.  Vegetables  · Fresh or frozen vegetables (raw, steamed, roasted, or grilled). Green " salads.  Fruits  · All fresh, canned (in natural juice), or frozen fruits.  Meats and other protein foods  · Ground beef (85% or leaner), grass-fed beef, or beef trimmed of fat. Skinless chicken or turkey. Ground chicken or turkey. Pork trimmed of fat. All fish and seafood. Egg whites. Dried beans, peas, or lentils. Unsalted nuts or seeds. Unsalted canned beans. Nut butters without added sugar or oil.  Dairy  · Low-fat or nonfat dairy products, such as skim or 1% milk, 2% or reduced-fat cheeses, low-fat and fat-free ricotta or cottage cheese, or plain low-fat and nonfat yogurt.  Fats and oils  · Tub margarine without trans fats. Light or reduced-fat mayonnaise and salad dressings. Avocado. Olive, canola, sesame, or safflower oils.  The items listed above may not be a complete list of recommended foods or beverages. Contact your dietitian for more options.  Foods to avoid  Grains  · White bread. White pasta. White rice. Cornbread. Bagels, pastries, and croissants. Crackers and snack foods that contain trans fat and hydrogenated oils.  Vegetables  · Vegetables cooked in cheese, cream, or butter sauce. Fried vegetables.  Fruits  · Canned fruit in heavy syrup. Fruit in cream or butter sauce. Fried fruit.  Meats and other protein foods  · Fatty cuts of meat. Ribs, chicken wings, jeffrey, sausage, bologna, salami, chitterlings, fatback, hot dogs, bratwurst, and packaged lunch meats. Liver and organ meats. Whole eggs and egg yolks. Chicken and turkey with skin. Fried meat.  Dairy  · Whole or 2% milk, cream, half-and-half, and cream cheese. Whole milk cheeses. Whole-fat or sweetened yogurt. Full-fat cheeses. Nondairy creamers and whipped toppings. Processed cheese, cheese spreads, and cheese curds.  Beverages  · Alcohol. Sugar-sweetened drinks such as sodas, lemonade, and fruit drinks.  Fats and oils  · Butter, stick margarine, lard, shortening, ghee, or jeffrey fat. Coconut, palm kernel, and palm oils.  Sweets and  desserts  · Corn syrup, sugars, honey, and molasses. Candy. Jam and jelly. Syrup. Sweetened cereals. Cookies, pies, cakes, donuts, muffins, and ice cream.  The items listed above may not be a complete list of foods and beverages to avoid. Contact your dietitian for more information.  Summary  · Choosing the right foods helps keep your fat and cholesterol at normal levels. This can keep you from getting certain diseases.  · At meals, fill one-half of your plate with vegetables and green salads.  · Eat high-fiber foods, like whole grains, beans, apples, carrots, peas, and barley.  · Limit added sugar, saturated fats, alcohol, and fried foods.  This information is not intended to replace advice given to you by your health care provider. Make sure you discuss any questions you have with your health care provider.  Document Released: 06/18/2013 Document Revised: 09/04/2018 Document Reviewed: 09/04/2018  [employer] Interactive Patient Education © 2019 [employer]  BMI for Adults  Body mass index (BMI) is a number that is calculated from a person's weight and height. In most adults, the number is used to find how much of an adult's weight is made up of fat. BMI is not as accurate as a direct measure of body fat.  How is BMI calculated?  BMI is calculated by dividing weight in kilograms by height in meters squared. It can also be calculated by dividing weight in pounds by height in inches squared, then multiplying the resulting number by 703. Charts are available to help you find your BMI quickly and easily without doing this calculation.  How is BMI interpreted?  Health care professionals use BMI charts to identify whether an adult is underweight, at a normal weight, or overweight based on the following guidelines:  · Underweight: BMI less than 18.5.  · Normal weight: BMI between 18.5 and 24.9.  · Overweight: BMI between 25 and 29.9.  · Obese: BMI of 30 and above.    BMI is usually interpreted the same for males and  females.  Weight includes both fat and muscle, so someone with a muscular build, such as an athlete, may have a BMI that is higher than 24.9. In cases like these, BMI may not accurately depict body fat. To determine if excess body fat is the cause of a BMI of 25 or higher, further assessments may need to be done by a health care provider.  Why is BMI a useful tool?  BMI is used to identify a possible weight problem that may be related to a medical problem or may increase the risk for medical problems. BMI can also be used to promote changes to reach a healthy weight.  This information is not intended to replace advice given to you by your health care provider. Make sure you discuss any questions you have with your health care provider.  Document Released: 08/29/2005 Document Revised: 04/27/2017 Document Reviewed: 05/15/2015  [employer] Interactive Patient Education © 2018 [employer]

## 2019-06-10 NOTE — PROGRESS NOTES
Problem list     Subjective   Kirstie Jackson is a 48 y.o. female     Chief Complaint   Patient presents with   • Hypertension   • Palpitations   • Shortness of Breath       HPI      Problem List:      1. Nonobstructive CAD per catheterization, 2011.  1.1 Stress test 4/6/16 - indeterminate risk due to inadequate time on the treadmill; poor exercise capacity  1.2 low risk stress test October 2016  2. Mild peripheral vascular disease per arterial duplex of the lower extremities, January 2012.  3. Hypertension  4. Dyslipidemia  5. Chronic palpitations likely secondary to anxiety and stress.  5.1 Event Monitor 10/21-11/3 - NSR  6. Smoking Habituation         Patient is a 48-year-old female who presents to the office for follow-up.  She has been doing well.  She occasionally will notice his chest discomfort that is atypical.  She describes sharp pains in her chest wall that is actually reproduced with palpation.  She feels that this is muscle type pain.  She describes having mild stable levels of dyspnea but no progressive shortness of breath.  No PND orthopnea.    She does palpitate on occasion but does not describe dizziness presyncope or syncope and otherwise is doing well      Outpatient Encounter Medications as of 6/10/2019   Medication Sig Dispense Refill   • cyanocobalamin 1000 MCG/ML injection INJECT 1 ML INTRAMUSCULARLY ONCE A MONTH     • cyclobenzaprine (FLEXERIL) 10 MG tablet Take  by mouth as needed.     • fexofenadine (ALLEGRA ALLERGY) 180 MG tablet Take 1 tablet by mouth daily.     • folic acid (FOLVITE) 1 MG tablet Take 1 mg by mouth 2 (Two) Times a Day.     • gabapentin (NEURONTIN) 600 MG tablet Take 300 mg by mouth at night.  0   • HYDROcodone-acetaminophen (NORCO) 7.5-325 MG per tablet prn     • hydrOXYzine (VISTARIL) 25 MG capsule 25 mg 4 (four) times a day as needed.  0   • metoprolol tartrate (LOPRESSOR) 50 MG tablet Take 1 tablet by mouth Every 12 (Twelve) Hours. 180 tablet 2   • montelukast  (SINGULAIR) 10 MG tablet Take 10 mg by mouth Every Night.     • nitroglycerin (NITROSTAT) 0.4 MG SL tablet Place  under the tongue. PLACE 1 TABLET UNDER THE TONGUE EVERY 5 MINUTES FOR UP TO 3 DOSES AS NEEDED FOR CHEST PAIN. CALL 911 IF PAIN PERSISTS.     • potassium bicarbonate (K-LYTE) 25 MEQ disintegrating tablet Take 25 mEq by mouth Daily.     • raNITIdine (ZANTAC) 300 MG tablet 2 (Two) Times a Day.  0   • tiZANidine (ZANAFLEX) 4 MG tablet Take 4 mg by mouth At Night As Needed for Muscle Spasms.     • pravastatin (PRAVACHOL) 20 MG tablet Take 1 tablet by mouth Daily. 90 tablet 3   • [DISCONTINUED] zafirlukast (ACCOLATE) 20 MG tablet 2 (Two) Times a Day.  0     No facility-administered encounter medications on file as of 6/10/2019.        Amoxicillin and Codeine    Past Medical History:   Diagnosis Date   • Anxiety    • Asthma    • Atypical chest pain    • CAD (coronary artery disease)     2-vessel   • Chronic headaches    • Dyslipidemia    • Dyspnea    • GERD (gastroesophageal reflux disease)    • H/O cardiac catheterization 2011   • H/O: osteoarthritis    • History of peripheral arterial disease    • Hyperlipidemia    • Hypertension    • Osteoarthritis    • Palpitations    • Peripheral artery disease (CMS/HCC)    • Seasonal allergies    • Sternocleidomastoid muscle tenderness    • Vitamin B12 deficiency (non anemic)        Social History     Socioeconomic History   • Marital status:      Spouse name: Not on file   • Number of children: Not on file   • Years of education: Not on file   • Highest education level: Not on file   Tobacco Use   • Smoking status: Current Every Day Smoker     Packs/day: 0.50     Types: Cigarettes   • Smokeless tobacco: Never Used   Substance and Sexual Activity   • Alcohol use: No   • Drug use: No       Family History   Problem Relation Age of Onset   • Heart attack Father    • Other Father         CABG   • Hypertension Father    • Heart attack Paternal Grandfather    • No Known  "Problems Mother        Review of Systems   Constitutional: Positive for fatigue (stays tired ). Negative for chills and fever.   HENT: Positive for congestion (nasal congestion ).    Eyes: Negative for visual disturbance (reading glasses ).   Respiratory: Positive for shortness of breath (when walking up hills ). Negative for chest tightness.    Cardiovascular: Positive for chest pain (some tenderness in mid chest, feels like it is related to muscle ), palpitations (Palpitations, races ) and leg swelling (LE edema off and on ).   Gastrointestinal: Negative.    Endocrine: Negative.  Negative for cold intolerance and heat intolerance.   Genitourinary: Negative.    Musculoskeletal: Positive for arthralgias (joints ) and back pain (Pain in entire spine ).   Skin: Negative.  Negative for rash and wound.   Allergic/Immunologic: Positive for environmental allergies. Negative for food allergies.   Neurological: Positive for light-headedness (occasional ). Negative for dizziness and weakness.   Hematological: Negative.  Does not bruise/bleed easily.   Psychiatric/Behavioral: Negative.  Negative for sleep disturbance (Denies waking with smothering or SOA).   All other systems reviewed and are negative.      Objective   Vitals:    06/10/19 1349   BP: 143/77   BP Location: Left arm   Patient Position: Sitting   Pulse: 70   SpO2: 96%   Weight: 99.8 kg (220 lb)   Height: 167.6 cm (66\")      /77 (BP Location: Left arm, Patient Position: Sitting)   Pulse 70   Ht 167.6 cm (66\")   Wt 99.8 kg (220 lb)   SpO2 96%   BMI 35.51 kg/m²     Lab Results (most recent)     None          Physical Exam   Constitutional: She is oriented to person, place, and time. She appears well-developed and well-nourished. No distress.   HENT:   Head: Normocephalic and atraumatic.   Eyes: EOM are normal. Pupils are equal, round, and reactive to light.   Neck: No JVD present.   Cardiovascular: Normal rate, regular rhythm, normal heart sounds and " intact distal pulses. Exam reveals no gallop and no friction rub.   No murmur heard.  Pulmonary/Chest: Effort normal and breath sounds normal. No respiratory distress. She has no wheezes. She has no rales. She exhibits no tenderness.   Musculoskeletal: Normal range of motion. She exhibits no edema.   Neurological: She is alert and oriented to person, place, and time. No cranial nerve deficit.   Skin: Skin is warm and dry. No rash noted. No erythema. No pallor.   Psychiatric: She has a normal mood and affect. Her behavior is normal.   Nursing note and vitals reviewed.      Procedure     ECG 12 Lead  Date/Time: 6/10/2019 2:06 PM  Performed by: Jaurez Segal PA  Authorized by: Jaurez Segal PA   Comments: Palpitations    EKG monitor sinus rhythm at 71 bpm, normal axis, no acute ST changes compared to EKG July 2016               Assessment/Plan     Problems Addressed this Visit        Cardiovascular and Mediastinum    Palpitations - Primary    Relevant Orders    ECG 12 Lead       Respiratory    Shortness of breath       Nervous and Auditory    Chest pain          Recommendation  1.  Patient with complaints of chest pain but atypical.  It is likely musculoskeletal based on patient's symptoms.  2.  Patient with mild levels of shortness of breath but no progressive dyspnea.  Palpitations happen on occasion and can be exacerbated with stress.  For now, with patient's symptoms, we will consider continuing medical management.  We will see her back for follow-up in 6 months to year or sooner symptoms discussed.  She will follow with primary scheduled           I advised Kirstie of the risks of continuing to use tobacco, and I provided her with tobacco cessation educational materials in the After Visit Summary.     During this visit, I spent <3 minutes counseling the patient regarding tobacco cessation.     Patient's Body mass index is 35.51 kg/m². BMI is above normal parameters. Recommendations include: educational  material and referral to primary care.       Electronically signed by:

## 2020-06-11 ENCOUNTER — OFFICE VISIT (OUTPATIENT)
Dept: CARDIOLOGY | Facility: CLINIC | Age: 50
End: 2020-06-11

## 2020-06-11 VITALS
HEIGHT: 66 IN | SYSTOLIC BLOOD PRESSURE: 156 MMHG | DIASTOLIC BLOOD PRESSURE: 85 MMHG | TEMPERATURE: 97.8 F | WEIGHT: 227.4 LBS | HEART RATE: 70 BPM | OXYGEN SATURATION: 95 % | BODY MASS INDEX: 36.55 KG/M2

## 2020-06-11 DIAGNOSIS — I10 ESSENTIAL HYPERTENSION: ICD-10-CM

## 2020-06-11 DIAGNOSIS — R06.02 SOB (SHORTNESS OF BREATH): ICD-10-CM

## 2020-06-11 DIAGNOSIS — R09.89 CAROTID BRUIT, UNSPECIFIED LATERALITY: ICD-10-CM

## 2020-06-11 DIAGNOSIS — R00.2 PALPITATIONS: ICD-10-CM

## 2020-06-11 DIAGNOSIS — R01.1 HEART MURMUR: ICD-10-CM

## 2020-06-11 DIAGNOSIS — R07.89 OTHER CHEST PAIN: Primary | ICD-10-CM

## 2020-06-11 PROCEDURE — 99214 OFFICE O/P EST MOD 30 MIN: CPT | Performed by: PHYSICIAN ASSISTANT

## 2020-06-11 PROCEDURE — 93000 ELECTROCARDIOGRAM COMPLETE: CPT | Performed by: PHYSICIAN ASSISTANT

## 2020-06-11 RX ORDER — METHOCARBAMOL 500 MG/1
500 TABLET, FILM COATED ORAL AS NEEDED
COMMUNITY
End: 2021-08-25

## 2020-06-11 RX ORDER — FAMOTIDINE 40 MG/1
40 TABLET, FILM COATED ORAL 2 TIMES DAILY
COMMUNITY
Start: 2020-03-18

## 2020-06-11 RX ORDER — ATORVASTATIN CALCIUM 20 MG/1
20 TABLET, FILM COATED ORAL DAILY
Qty: 30 TABLET | Refills: 11 | Status: SHIPPED | OUTPATIENT
Start: 2020-06-11 | End: 2021-02-01

## 2020-06-11 NOTE — PROGRESS NOTES
Problem list     Subjective   Kirstie Jackson is a 49 y.o. female     Chief Complaint   Patient presents with   • Chest Pain     presents for yearly f/u   • Palpitations   • Hypertension   • Shortness of Breath   Problem List:      1. Nonobstructive CAD per catheterization, 2011.  1.1 Stress test 4/6/16 - indeterminate risk due to inadequate time on the treadmill; poor exercise capacity  1.2 low risk stress test October 2016  2. Mild peripheral vascular disease per arterial duplex of the lower extremities, January 2012.  3. Hypertension  4. Dyslipidemia  5. Chronic palpitations likely secondary to anxiety and stress.  5.1 Event Monitor 10/21-11/3 - NSR  6. Smoking Habituation  7.  Chronic venous insufficiency    HPI    Patient is a 49-year-old female that presents back to the office for routine follow-up.  She has history of nonobstructive coronary and peripheral vascular disease.  She has been monitored routinely.  She has chronic lower extremity edema and chronic venous insufficiency    Patient has an occasional chest discomfort as a sharp pain that will occur at random.  This is something that has not progressed or changed since being seen here in the office.  Patient has shortness of breath that is mild but no progressive dyspnea no decline in functional status.  She does not describe PND orthopnea.    She does have venous insufficiency and varicosities.  She notices that.  She also describes when sitting for prolonged period she will have edema more noticeable.  She does palpitate on occasion.  A fluttering type sensation but this is chronic without any symptoms of cerebral embolic events.  She does not describe dizziness presyncope or syncope.  She feels well otherwise      Current Outpatient Medications on File Prior to Visit   Medication Sig Dispense Refill   • cyanocobalamin 1000 MCG/ML injection INJECT 1 ML INTRAMUSCULARLY ONCE A MONTH     • cyclobenzaprine (FLEXERIL) 10 MG tablet Take  by mouth as needed.      • famotidine (PEPCID) 40 MG tablet Take 40 mg by mouth 2 (Two) Times a Day.     • fexofenadine (ALLEGRA ALLERGY) 180 MG tablet Take 1 tablet by mouth daily.     • gabapentin (NEURONTIN) 600 MG tablet Take 300 mg by mouth at night.  0   • HYDROcodone-acetaminophen (NORCO) 7.5-325 MG per tablet prn     • methocarbamol (ROBAXIN) 500 MG tablet Take 500 mg by mouth As Needed for Muscle Spasms.     • metoprolol tartrate (LOPRESSOR) 50 MG tablet Take 1 tablet by mouth Every 12 (Twelve) Hours. 180 tablet 2   • montelukast (SINGULAIR) 10 MG tablet Take 10 mg by mouth Every Night.     • nitroglycerin (NITROSTAT) 0.4 MG SL tablet Place  under the tongue. PLACE 1 TABLET UNDER THE TONGUE EVERY 5 MINUTES FOR UP TO 3 DOSES AS NEEDED FOR CHEST PAIN. CALL 911 IF PAIN PERSISTS.     • [DISCONTINUED] folic acid (FOLVITE) 1 MG tablet Take 1 mg by mouth 2 (Two) Times a Day.     • [DISCONTINUED] hydrOXYzine (VISTARIL) 25 MG capsule 25 mg 4 (four) times a day as needed.  0   • [DISCONTINUED] potassium bicarbonate (K-LYTE) 25 MEQ disintegrating tablet Take 25 mEq by mouth Daily.     • [DISCONTINUED] pravastatin (PRAVACHOL) 20 MG tablet Take 1 tablet by mouth Daily. 90 tablet 3   • [DISCONTINUED] raNITIdine (ZANTAC) 300 MG tablet 2 (Two) Times a Day.  0   • [DISCONTINUED] tiZANidine (ZANAFLEX) 4 MG tablet Take 4 mg by mouth At Night As Needed for Muscle Spasms.       No current facility-administered medications on file prior to visit.        Amoxicillin and Codeine    Past Medical History:   Diagnosis Date   • Anxiety    • Asthma    • Atypical chest pain    • CAD (coronary artery disease)     2-vessel   • Chronic headaches    • Dyslipidemia    • Dyspnea    • GERD (gastroesophageal reflux disease)    • H/O cardiac catheterization 2011   • H/O: osteoarthritis    • History of peripheral arterial disease    • Hyperlipidemia    • Hypertension    • Osteoarthritis    • Palpitations    • Peripheral artery disease (CMS/HCC)    • Seasonal  "allergies    • Sternocleidomastoid muscle tenderness    • Vitamin B12 deficiency (non anemic)        Social History     Socioeconomic History   • Marital status:      Spouse name: Not on file   • Number of children: Not on file   • Years of education: Not on file   • Highest education level: Not on file   Tobacco Use   • Smoking status: Current Every Day Smoker     Packs/day: 0.50     Types: Cigarettes   • Smokeless tobacco: Never Used   Substance and Sexual Activity   • Alcohol use: No   • Drug use: No       Family History   Problem Relation Age of Onset   • Heart attack Father    • Other Father         CABG   • Hypertension Father    • Heart attack Paternal Grandfather    • No Known Problems Mother        Review of Systems   Constitutional: Positive for fatigue.   HENT:        Sinus issues   Eyes: Negative.    Respiratory: Positive for cough and shortness of breath (when climbing stairs).    Cardiovascular: Positive for chest pain (muscle tightness across chest and shoulders), palpitations (racing) and leg swelling.   Gastrointestinal: Negative.    Endocrine: Negative.    Genitourinary: Negative.    Musculoskeletal: Positive for arthralgias, back pain, myalgias and neck pain.   Skin: Negative.    Allergic/Immunologic: Positive for environmental allergies.   Neurological: Positive for numbness (feet).   Hematological: Negative.    Psychiatric/Behavioral: Positive for sleep disturbance (off and on). The patient is nervous/anxious.    All other systems reviewed and are negative.      Objective   Vitals:    06/11/20 0845   BP: 156/85   BP Location: Left arm   Patient Position: Sitting   Pulse: 70   Temp: 97.8 °F (36.6 °C)   SpO2: 95%   Weight: 103 kg (227 lb 6.4 oz)   Height: 167.6 cm (66\")      /85 (BP Location: Left arm, Patient Position: Sitting)   Pulse 70   Temp 97.8 °F (36.6 °C)   Ht 167.6 cm (66\")   Wt 103 kg (227 lb 6.4 oz)   SpO2 95%   BMI 36.70 kg/m²     Lab Results (most recent)     None "          Physical Exam   Constitutional: She is oriented to person, place, and time. She appears well-developed and well-nourished. No distress.   HENT:   Head: Normocephalic and atraumatic.   Eyes: Conjunctivae are normal. Right eye exhibits no discharge. Left eye exhibits no discharge. No scleral icterus.   Neck: No JVD present. Carotid bruit is present.   Cardiovascular: Normal rate, regular rhythm and normal heart sounds. Exam reveals no gallop and no friction rub.   No murmur heard.  Grade 1/6 systolic murmur at the right upper sternal border radiating to left   Pulmonary/Chest: Effort normal and breath sounds normal. No respiratory distress. She has no wheezes. She has no rales. She exhibits no tenderness.   Musculoskeletal: Normal range of motion. She exhibits edema.   Varicosities noted with evidence of venous stasis and mild edema bilaterally   Neurological: She is alert and oriented to person, place, and time. No cranial nerve deficit.   Skin: Skin is warm and dry. No rash noted. No erythema. No pallor.   Psychiatric: She has a normal mood and affect. Her behavior is normal.   Nursing note and vitals reviewed.      Procedure     ECG 12 Lead  Date/Time: 6/11/2020 8:51 AM  Performed by: Juarez Segal PA  Authorized by: Juarez Segal PA   Comparison: compared with previous ECG from 6/10/2019  Comments: EKG demonstrates sinus rhythm at 66 bpm with no acute ST changes               Assessment/Plan     Problems Addressed this Visit        Cardiovascular and Mediastinum    Palpitations    Relevant Orders    ECG 12 Lead    Adult Transthoracic Echo Complete W/ Cont if Necessary Per Protocol    Essential hypertension    Relevant Orders    ECG 12 Lead    Adult Transthoracic Echo Complete W/ Cont if Necessary Per Protocol    Carotid bruit    Relevant Orders    Adult Transthoracic Echo Complete W/ Cont if Necessary Per Protocol    Duplex Carotid Ultrasound CAR    Heart murmur    Relevant Orders    Adult  Transthoracic Echo Complete W/ Cont if Necessary Per Protocol       Respiratory    SOB (shortness of breath)    Relevant Orders    ECG 12 Lead    Adult Transthoracic Echo Complete W/ Cont if Necessary Per Protocol       Nervous and Auditory    Chest pain - Primary    Relevant Orders    ECG 12 Lead    Adult Transthoracic Echo Complete W/ Cont if Necessary Per Protocol          Recommendation  1.  Patient with coronary artery disease with atypical chest pain.  This has been chronic.  She has had evaluation in the past and she has not had any change.  For now we will continue to monitor her symptoms    2.  Patient with carotid bruit on examination versus transmitted heart sounds.  I will schedule carotid duplex to ensure no obstructive disease    3.  Heart murmur noted on examination.  It is grade 1-2 most notable in the right upper sternal border.  I would like to repeat an echocardiogram and she has history of valvular disease    4.  Patient with baseline hypertension.  We will continue to monitor and continue current medical regimen.    5.  I would like for her to start statin therapy because of history of vascular disease and nonobstructive coronary disease.  Lipids are being monitored by primary but I feel that there is benefit of taking because of her known history of atherosclerosis    6.  Patient with chronic lower extremity edema.  We did discuss chronic venous insufficiency.  We did discuss that we could possibly get her to a vein clinic.  There are venous stripping procedures that I do not feel may improve her edema but could improve from a cosmetic standpoint.  For now she will monitor.  We will see her back for follow-up after testing.  Follow-up with primary as scheduled           Kirstie Jackson  reports that she has been smoking cigarettes. She has been smoking about 0.50 packs per day. She has never used smokeless tobacco.. I have educated her on the risk of diseases from using tobacco products such as  cancer, COPD and heart diease.     I advised her to quit and she is not willing to quit.    I spent< 3  minutes counseling the patient.          Patient's Body mass index is 36.7 kg/m². BMI is above normal parameters. Recommendations include: educational material.       Electronically signed by:

## 2020-06-11 NOTE — PATIENT INSTRUCTIONS
Heart-Healthy Eating Plan  Many factors influence your heart (coronary) health, including eating and exercise habits. Coronary risk increases with abnormal blood fat (lipid) levels. Heart-healthy meal planning includes limiting unhealthy fats, increasing healthy fats, and making other diet and lifestyle changes.  What is my plan?  Your health care provider may recommend that you:  · Limit your fat intake to _________% or less of your total calories each day.  · Limit your saturated fat intake to _________% or less of your total calories each day.  · Limit the amount of cholesterol in your diet to less than _________ mg per day.  What are tips for following this plan?  Cooking  Cook foods using methods other than frying. Baking, boiling, grilling, and broiling are all good options. Other ways to reduce fat include:  · Removing the skin from poultry.  · Removing all visible fats from meats.  · Steaming vegetables in water or broth.  Meal planning    · At meals, imagine dividing your plate into fourths:  ? Fill one-half of your plate with vegetables and green salads.  ? Fill one-fourth of your plate with whole grains.  ? Fill one-fourth of your plate with lean protein foods.  · Eat 4-5 servings of vegetables per day. One serving equals 1 cup raw or cooked vegetable, or 2 cups raw leafy greens.  · Eat 4-5 servings of fruit per day. One serving equals 1 medium whole fruit, ¼ cup dried fruit, ½ cup fresh, frozen, or canned fruit, or ½ cup 100% fruit juice.  · Eat more foods that contain soluble fiber. Examples include apples, broccoli, carrots, beans, peas, and barley. Aim to get 25-30 g of fiber per day.  · Increase your consumption of legumes, nuts, and seeds to 4-5 servings per week. One serving of dried beans or legumes equals ½ cup cooked, 1 serving of nuts is ¼ cup, and 1 serving of seeds equals 1 tablespoon.  Fats  · Choose healthy fats more often. Choose monounsaturated and polyunsaturated fats, such as olive and  canola oils, flaxseeds, walnuts, almonds, and seeds.  · Eat more omega-3 fats. Choose salmon, mackerel, sardines, tuna, flaxseed oil, and ground flaxseeds. Aim to eat fish at least 2 times each week.  · Check food labels carefully to identify foods with trans fats or high amounts of saturated fat.  · Limit saturated fats. These are found in animal products, such as meats, butter, and cream. Plant sources of saturated fats include palm oil, palm kernel oil, and coconut oil.  · Avoid foods with partially hydrogenated oils in them. These contain trans fats. Examples are stick margarine, some tub margarines, cookies, crackers, and other baked goods.  · Avoid fried foods.  General information  · Eat more home-cooked food and less restaurant, buffet, and fast food.  · Limit or avoid alcohol.  · Limit foods that are high in starch and sugar.  · Lose weight if you are overweight. Losing just 5-10% of your body weight can help your overall health and prevent diseases such as diabetes and heart disease.  · Monitor your salt (sodium) intake, especially if you have high blood pressure. Talk with your health care provider about your sodium intake.  · Try to incorporate more vegetarian meals weekly.  What foods can I eat?  Fruits  All fresh, canned (in natural juice), or frozen fruits.  Vegetables  Fresh or frozen vegetables (raw, steamed, roasted, or grilled). Green salads.  Grains  Most grains. Choose whole wheat and whole grains most of the time. Rice and pasta, including brown rice and pastas made with whole wheat.  Meats and other proteins  Lean, well-trimmed beef, veal, pork, and lamb. Chicken and turkey without skin. All fish and shellfish. Wild duck, rabbit, pheasant, and venison. Egg whites or low-cholesterol egg substitutes. Dried beans, peas, lentils, and tofu. Seeds and most nuts.  Dairy  Low-fat or nonfat cheeses, including ricotta and mozzarella. Skim or 1% milk (liquid, powdered, or evaporated). Buttermilk made  with low-fat milk. Nonfat or low-fat yogurt.  Fats and oils  Non-hydrogenated (trans-free) margarines. Vegetable oils, including soybean, sesame, sunflower, olive, peanut, safflower, corn, canola, and cottonseed. Salad dressings or mayonnaise made with a vegetable oil.  Beverages  Water (mineral or sparkling). Coffee and tea. Diet carbonated beverages.  Sweets and desserts  Sherbet, gelatin, and fruit ice. Small amounts of dark chocolate.  Limit all sweets and desserts.  Seasonings and condiments  All seasonings and condiments.  The items listed above may not be a complete list of foods and beverages you can eat. Contact a dietitian for more options.  What foods are not recommended?  Fruits  Canned fruit in heavy syrup. Fruit in cream or butter sauce. Fried fruit. Limit coconut.  Vegetables  Vegetables cooked in cheese, cream, or butter sauce. Fried vegetables.  Grains  Breads made with saturated or trans fats, oils, or whole milk. Croissants. Sweet rolls. Donuts. High-fat crackers, such as cheese crackers.  Meats and other proteins  Fatty meats, such as hot dogs, ribs, sausage, jeffrey, rib-eye roast or steak. High-fat deli meats, such as salami and bologna. Caviar. Domestic duck and goose. Organ meats, such as liver.  Dairy  Cream, sour cream, cream cheese, and creamed cottage cheese. Whole milk cheeses. Whole or 2% milk (liquid, evaporated, or condensed). Whole buttermilk. Cream sauce or high-fat cheese sauce. Whole-milk yogurt.  Fats and oils  Meat fat, or shortening. Cocoa butter, hydrogenated oils, palm oil, coconut oil, palm kernel oil. Solid fats and shortenings, including jeffrey fat, salt pork, lard, and butter. Nondairy cream substitutes. Salad dressings with cheese or sour cream.  Beverages  Regular sodas and any drinks with added sugar.  Sweets and desserts  Frosting. Pudding. Cookies. Cakes. Pies. Milk chocolate or white chocolate. Buttered syrups. Full-fat ice cream or ice cream drinks.  The items listed  above may not be a complete list of foods and beverages to avoid. Contact a dietitian for more information.  Summary  · Heart-healthy meal planning includes limiting unhealthy fats, increasing healthy fats, and making other diet and lifestyle changes.  · Lose weight if you are overweight. Losing just 5-10% of your body weight can help your overall health and prevent diseases such as diabetes and heart disease.  · Focus on eating a balance of foods, including fruits and vegetables, low-fat or nonfat dairy, lean protein, nuts and legumes, whole grains, and heart-healthy oils and fats.  This information is not intended to replace advice given to you by your health care provider. Make sure you discuss any questions you have with your health care provider.  Document Released: 09/26/2009 Document Revised: 01/25/2019 Document Reviewed: 01/25/2019  [employer] Patient Education © 2020 [employer]  For more information:    Quit Now [place]  1-800-QUIT-NOW  https://[place].quitlogix.org/en-[place]/  Steps to Quit Smoking  Smoking tobacco can be harmful to your health and can affect almost every organ in your body. Smoking puts you, and those around you, at risk for developing many serious chronic diseases. Quitting smoking is difficult, but it is one of the best things that you can do for your health. It is never too late to quit.  What are the benefits of quitting smoking?  When you quit smoking, you lower your risk of developing serious diseases and conditions, such as:  · Lung cancer or lung disease, such as COPD.  · Heart disease.  · Stroke.  · Heart attack.  · Infertility.  · Osteoporosis and bone fractures.  Additionally, symptoms such as coughing, wheezing, and shortness of breath may get better when you quit. You may also find that you get sick less often because your body is stronger at fighting off colds and infections. If you are pregnant, quitting smoking can help to reduce your chances of having a baby of low birth  weight.  How do I get ready to quit?  When you decide to quit smoking, create a plan to make sure that you are successful. Before you quit:  · Pick a date to quit. Set a date within the next two weeks to give you time to prepare.  · Write down the reasons why you are quitting. Keep this list in places where you will see it often, such as on your bathroom mirror or in your car or wallet.  · Identify the people, places, things, and activities that make you want to smoke (triggers) and avoid them. Make sure to take these actions:  ¨ Throw away all cigarettes at home, at work, and in your car.  ¨ Throw away smoking accessories, such as ashtrays and lighters.  ¨ Clean your car and make sure to empty the ashtray.  ¨ Clean your home, including curtains and carpets.  · Tell your family, friends, and coworkers that you are quitting. Support from your loved ones can make quitting easier.  · Talk with your health care provider about your options for quitting smoking.  · Find out what treatment options are covered by your health insurance.  What strategies can I use to quit smoking?  Talk with your healthcare provider about different strategies to quit smoking. Some strategies include:  · Quitting smoking altogether instead of gradually lessening how much you smoke over a period of time. Research shows that quitting “cold turkey” is more successful than gradually quitting.  · Attending in-person counseling to help you build problem-solving skills. You are more likely to have success in quitting if you attend several counseling sessions. Even short sessions of 10 minutes can be effective.  · Finding resources and support systems that can help you to quit smoking and remain smoke-free after you quit. These resources are most helpful when you use them often. They can include:  ¨ Online chats with a counselor.  ¨ Telephone quitlines.  ¨ Printed self-help materials.  ¨ Support groups or group counseling.  ¨ Text messaging  programs.  ¨ Mobile phone applications.  · Taking medicines to help you quit smoking. (If you are pregnant or breastfeeding, talk with your health care provider first.) Some medicines contain nicotine and some do not. Both types of medicines help with cravings, but the medicines that include nicotine help to relieve withdrawal symptoms. Your health care provider may recommend:  ¨ Nicotine patches, gum, or lozenges.  ¨ Nicotine inhalers or sprays.  ¨ Non-nicotine medicine that is taken by mouth.  Talk with your health care provider about combining strategies, such as taking medicines while you are also receiving in-person counseling. Using these two strategies together makes you more likely to succeed in quitting than if you used either strategy on its own.  If you are pregnant or breastfeeding, talk with your health care provider about finding counseling or other support strategies to quit smoking. Do not take medicine to help you quit smoking unless told to do so by your health care provider.  What things can I do to make it easier to quit?  Quitting smoking might feel overwhelming at first, but there is a lot that you can do to make it easier. Take these important actions:  · Reach out to your family and friends and ask that they support and encourage you during this time. Call telephone quitlines, reach out to support groups, or work with a counselor for support.  · Ask people who smoke to avoid smoking around you.  · Avoid places that trigger you to smoke, such as bars, parties, or smoke-break areas at work.  · Spend time around people who do not smoke.  · Lessen stress in your life, because stress can be a smoking trigger for some people. To lessen stress, try:  ¨ Exercising regularly.  ¨ Deep-breathing exercises.  ¨ Yoga.  ¨ Meditating.  ¨ Performing a body scan. This involves closing your eyes, scanning your body from head to toe, and noticing which parts of your body are particularly tense. Purposefully  relax the muscles in those areas.  · Download or purchase mobile phone or tablet apps (applications) that can help you stick to your quit plan by providing reminders, tips, and encouragement. There are many free apps, such as QuitGuide from the CDC (Centers for Disease Control and Prevention). You can find other support for quitting smoking (smoking cessation) through smokefree.gov and other websites.  How will I feel when I quit smoking?  Within the first 24 hours of quitting smoking, you may start to feel some withdrawal symptoms. These symptoms are usually most noticeable 2-3 days after quitting, but they usually do not last beyond 2-3 weeks. Changes or symptoms that you might experience include:  · Mood swings.  · Restlessness, anxiety, or irritation.  · Difficulty concentrating.  · Dizziness.  · Strong cravings for sugary foods in addition to nicotine.  · Mild weight gain.  · Constipation.  · Nausea.  · Coughing or a sore throat.  · Changes in how your medicines work in your body.  · A depressed mood.  · Difficulty sleeping (insomnia).  After the first 2-3 weeks of quitting, you may start to notice more positive results, such as:  · Improved sense of smell and taste.  · Decreased coughing and sore throat.  · Slower heart rate.  · Lower blood pressure.  · Clearer skin.  · The ability to breathe more easily.  · Fewer sick days.  Quitting smoking is very challenging for most people. Do not get discouraged if you are not successful the first time. Some people need to make many attempts to quit before they achieve long-term success. Do your best to stick to your quit plan, and talk with your health care provider if you have any questions or concerns.  This information is not intended to replace advice given to you by your health care provider. Make sure you discuss any questions you have with your health care provider.  Document Released: 12/12/2002 Document Revised: 08/15/2017 Document Reviewed:  05/03/2016  [employer] Patient Education © 2017 [employer]  Venous  Chronic Venous Insufficiency  Chronic venous insufficiency is a condition where the leg veins cannot effectively pump blood from the legs to the heart. This happens when the vein walls are either stretched, weakened, or damaged, or when the valves inside the vein are damaged. With the right treatment, you should be able to continue with an active life. This condition is also called venous stasis.  What are the causes?  Common causes of this condition include:  · High blood pressure inside the veins (venous hypertension).  · Sitting or standing too long, causing increased blood pressure in the leg veins.  · A blood clot that blocks blood flow in a vein (deep vein thrombosis, DVT).  · Inflammation of a vein (phlebitis) that causes a blood clot to form.  · Tumors in the pelvis that cause blood to back up.  What increases the risk?  The following factors may make you more likely to develop this condition:  · Having a family history of this condition.  · Obesity.  · Pregnancy.  · Living without enough regular physical activity or exercise (sedentary lifestyle).  · Smoking.  · Having a job that requires long periods of standing or sitting in one place.  · Being a certain age. Women in their 40s and 50s and men in their 70s are more likely to develop this condition.  What are the signs or symptoms?  Symptoms of this condition include:  · Veins that are enlarged, bulging, or twisted (varicose veins).  · Skin breakdown or ulcers.  · Reddened skin or dark discoloration of skin on the leg between the knee and ankle.  · Brown, smooth, tight, and painful skin just above the ankle, usually on the inside of the leg (lipodermatosclerosis).  · Swelling of the legs.  How is this diagnosed?  This condition may be diagnosed based on:  · Your medical history.  · A physical exam.  · Tests, such as:  ? A procedure that creates an image of a blood vessel and  nearby organs and provides information about blood flow through the blood vessel (duplex ultrasound).  ? A procedure that tests blood flow (plethysmography).  ? A procedure that looks at the veins using X-ray and dye (venogram).  How is this treated?  The goals of treatment are to help you return to an active life and to minimize pain or disability. Treatment depends on the severity of your condition, and it may include:  · Wearing compression stockings. These can help relieve symptoms and help prevent your condition from getting worse. However, they do not cure the condition.  · Sclerotherapy. This procedure involves an injection of a solution that shrinks damaged veins.  · Surgery. This may involve:  ? Removing a diseased vein (vein stripping).  ? Cutting off blood flow through the vein (laser ablation surgery).  ? Repairing or reconstructing a valve within the affected vein.  Follow these instructions at home:         · Wear compression stockings as told by your health care provider. These stockings help to prevent blood clots and reduce swelling in your legs.  · Take over-the-counter and prescription medicines only as told by your health care provider.  · Stay active by exercising, walking, or doing different activities. Ask your health care provider what activities are safe for you and how much exercise you need.  · Drink enough fluid to keep your urine pale yellow.  · Do not use any products that contain nicotine or tobacco, such as cigarettes, e-cigarettes, and chewing tobacco. If you need help quitting, ask your health care provider.  · Keep all follow-up visits as told by your health care provider. This is important.  Contact a health care provider if you:  · Have redness, swelling, or more pain in the affected area.  · See a red streak or line that goes up or down from the affected area.  · Have skin breakdown or skin loss in the affected area, even if the breakdown is small.  · Get an injury in the  affected area.  Get help right away if:  · You get an injury and an open wound in the affected area.  · You have:  ? Severe pain that does not get better with medicine.  ? Sudden numbness or weakness in the foot or ankle below the affected area.  ? Trouble moving your foot or ankle.  ? A fever.  ? Worse or persistent symptoms.  ? Chest pain.  ? Shortness of breath.  Summary  · Chronic venous insufficiency is a condition where the leg veins cannot effectively pump blood from the legs to the heart.  · Chronic venous insufficiency occurs when the vein walls become stretched, weakened, or damaged, or when valves within the vein are damaged.  · Treatment depends on how severe your condition is. It often involves wearing compression stockings and may involve having a procedure.  · Make sure you stay active by exercising, walking, or doing different activities. Ask your health care provider what activities are safe for you and how much exercise you need.  This information is not intended to replace advice given to you by your health care provider. Make sure you discuss any questions you have with your health care provider.  Document Released: 04/22/2008 Document Revised: 09/10/2019 Document Reviewed: 09/10/2019  [employer] Patient Education © 2020 [employer]

## 2020-06-30 ENCOUNTER — HOSPITAL ENCOUNTER (OUTPATIENT)
Dept: CARDIOLOGY | Facility: HOSPITAL | Age: 50
Discharge: HOME | End: 2020-06-30
Admitting: PHYSICIAN ASSISTANT

## 2020-06-30 ENCOUNTER — HOSPITAL ENCOUNTER (OUTPATIENT)
Dept: CARDIOLOGY | Facility: HOSPITAL | Age: 50
Discharge: HOME | End: 2020-06-30

## 2020-06-30 VITALS — WEIGHT: 227.07 LBS | HEIGHT: 66 IN | BODY MASS INDEX: 36.49 KG/M2

## 2020-06-30 DIAGNOSIS — I10 ESSENTIAL HYPERTENSION: ICD-10-CM

## 2020-06-30 DIAGNOSIS — R07.89 OTHER CHEST PAIN: ICD-10-CM

## 2020-06-30 DIAGNOSIS — R00.2 PALPITATIONS: ICD-10-CM

## 2020-06-30 DIAGNOSIS — R09.89 CAROTID BRUIT, UNSPECIFIED LATERALITY: ICD-10-CM

## 2020-06-30 DIAGNOSIS — R06.02 SOB (SHORTNESS OF BREATH): ICD-10-CM

## 2020-06-30 DIAGNOSIS — R01.1 HEART MURMUR: ICD-10-CM

## 2020-06-30 PROCEDURE — 93306 TTE W/DOPPLER COMPLETE: CPT

## 2020-06-30 PROCEDURE — 93880 EXTRACRANIAL BILAT STUDY: CPT

## 2020-06-30 PROCEDURE — 93306 TTE W/DOPPLER COMPLETE: CPT | Performed by: INTERNAL MEDICINE

## 2020-06-30 PROCEDURE — 93880 EXTRACRANIAL BILAT STUDY: CPT | Performed by: INTERNAL MEDICINE

## 2020-07-05 LAB
AORTIC DIMENSIONLESS INDEX: 0.9 (DI)
BH CV ECHO MEAS - ACS: 1.6 CM
BH CV ECHO MEAS - AI DEC SLOPE: 243 CM/SEC^2
BH CV ECHO MEAS - AI MAX PG: 54.5 MMHG
BH CV ECHO MEAS - AI MAX VEL: 369 CM/SEC
BH CV ECHO MEAS - AI P1/2T: 444.8 MSEC
BH CV ECHO MEAS - AO MAX PG (FULL): 1.8 MMHG
BH CV ECHO MEAS - AO MAX PG: 9.6 MMHG
BH CV ECHO MEAS - AO MEAN PG (FULL): 1 MMHG
BH CV ECHO MEAS - AO MEAN PG: 5 MMHG
BH CV ECHO MEAS - AO ROOT AREA (BSA CORRECTED): 1.6
BH CV ECHO MEAS - AO ROOT AREA: 8.6 CM^2
BH CV ECHO MEAS - AO ROOT DIAM: 3.3 CM
BH CV ECHO MEAS - AO V2 MAX: 155 CM/SEC
BH CV ECHO MEAS - AO V2 MEAN: 99.9 CM/SEC
BH CV ECHO MEAS - AO V2 VTI: 37.5 CM
BH CV ECHO MEAS - BSA(HAYCOCK): 2.2 M^2
BH CV ECHO MEAS - BSA(HAYCOCK): 2.2 M^2
BH CV ECHO MEAS - BSA: 2.1 M^2
BH CV ECHO MEAS - BSA: 2.1 M^2
BH CV ECHO MEAS - BZI_BMI: 36.6 KILOGRAMS/M^2
BH CV ECHO MEAS - BZI_BMI: 36.6 KILOGRAMS/M^2
BH CV ECHO MEAS - BZI_METRIC_HEIGHT: 167.6 CM
BH CV ECHO MEAS - BZI_METRIC_HEIGHT: 167.6 CM
BH CV ECHO MEAS - BZI_METRIC_WEIGHT: 103 KG
BH CV ECHO MEAS - BZI_METRIC_WEIGHT: 103 KG
BH CV ECHO MEAS - EDV(CUBED): 107.2 ML
BH CV ECHO MEAS - EDV(TEICH): 104.9 ML
BH CV ECHO MEAS - EF(CUBED): 68.3 %
BH CV ECHO MEAS - EF(TEICH): 59.8 %
BH CV ECHO MEAS - ESV(CUBED): 34 ML
BH CV ECHO MEAS - ESV(TEICH): 42.2 ML
BH CV ECHO MEAS - FS: 31.8 %
BH CV ECHO MEAS - IVS/LVPW: 0.87
BH CV ECHO MEAS - IVSD: 0.89 CM
BH CV ECHO MEAS - LA DIMENSION: 3.5 CM
BH CV ECHO MEAS - LA/AO: 1.1
BH CV ECHO MEAS - LAT PEAK E' VEL: 10.1 CM/SEC
BH CV ECHO MEAS - LV IVRT: 0.11 SEC
BH CV ECHO MEAS - LV MASS(C)D: 158.5 GRAMS
BH CV ECHO MEAS - LV MASS(C)DI: 75.1 GRAMS/M^2
BH CV ECHO MEAS - LV MAX PG: 7.8 MMHG
BH CV ECHO MEAS - LV MEAN PG: 4 MMHG
BH CV ECHO MEAS - LV V1 MAX: 140 CM/SEC
BH CV ECHO MEAS - LV V1 MEAN: 83.2 CM/SEC
BH CV ECHO MEAS - LV V1 VTI: 32.7 CM
BH CV ECHO MEAS - LVIDD: 4.8 CM
BH CV ECHO MEAS - LVIDS: 3.2 CM
BH CV ECHO MEAS - LVPWD: 1 CM
BH CV ECHO MEAS - MED PEAK E' VEL: 9.3 CM/SEC
BH CV ECHO MEAS - MV A MAX VEL: 83.6 CM/SEC
BH CV ECHO MEAS - MV DEC SLOPE: 503 CM/SEC^2
BH CV ECHO MEAS - MV DEC TIME: 0.23 SEC
BH CV ECHO MEAS - MV E MAX VEL: 102 CM/SEC
BH CV ECHO MEAS - MV E/A: 1.2
BH CV ECHO MEAS - MV MAX PG: 3.8 MMHG
BH CV ECHO MEAS - MV MEAN PG: 2 MMHG
BH CV ECHO MEAS - MV P1/2T MAX VEL: 120 CM/SEC
BH CV ECHO MEAS - MV P1/2T: 69.9 MSEC
BH CV ECHO MEAS - MV V2 MAX: 97.6 CM/SEC
BH CV ECHO MEAS - MV V2 MEAN: 61 CM/SEC
BH CV ECHO MEAS - MV V2 VTI: 30.9 CM
BH CV ECHO MEAS - MVA P1/2T LCG: 1.8 CM^2
BH CV ECHO MEAS - MVA(P1/2T): 3.1 CM^2
BH CV ECHO MEAS - PA MAX PG (FULL): -0.63 MMHG
BH CV ECHO MEAS - PA MAX PG: 3.7 MMHG
BH CV ECHO MEAS - PA MEAN PG (FULL): 0 MMHG
BH CV ECHO MEAS - PA MEAN PG: 2 MMHG
BH CV ECHO MEAS - PA V2 MAX: 96.1 CM/SEC
BH CV ECHO MEAS - PA V2 MEAN: 66.1 CM/SEC
BH CV ECHO MEAS - PA V2 VTI: 24.4 CM
BH CV ECHO MEAS - RAP SYSTOLE: 10 MMHG
BH CV ECHO MEAS - RV MAX PG: 4.3 MMHG
BH CV ECHO MEAS - RV MEAN PG: 2 MMHG
BH CV ECHO MEAS - RV V1 MAX: 104 CM/SEC
BH CV ECHO MEAS - RV V1 MEAN: 72.1 CM/SEC
BH CV ECHO MEAS - RV V1 VTI: 32.5 CM
BH CV ECHO MEAS - RVDD: 2.7 CM
BH CV ECHO MEAS - RVSP: 15.3 MMHG
BH CV ECHO MEAS - SI(AO): 152 ML/M^2
BH CV ECHO MEAS - SI(CUBED): 34.7 ML/M^2
BH CV ECHO MEAS - SI(TEICH): 29.7 ML/M^2
BH CV ECHO MEAS - SV(AO): 320.7 ML
BH CV ECHO MEAS - SV(CUBED): 73.2 ML
BH CV ECHO MEAS - SV(TEICH): 62.7 ML
BH CV ECHO MEAS - TR MAX VEL: 115 CM/SEC
BH CV ECHO MEASUREMENTS AVERAGE E/E' RATIO: 10.52
BH CV XLRA MEAS LEFT BULB EDV: -22.6 CM/SEC
BH CV XLRA MEAS LEFT BULB PSV: -90.4 CM/SEC
BH CV XLRA MEAS LEFT CCA RATIO VEL: -92.3 CM/SEC
BH CV XLRA MEAS LEFT DIST CCA EDV: -28.5 CM/SEC
BH CV XLRA MEAS LEFT DIST CCA PSV: -93.3 CM/SEC
BH CV XLRA MEAS LEFT DIST ICA EDV: -26.5 CM/SEC
BH CV XLRA MEAS LEFT DIST ICA PSV: -68.4 CM/SEC
BH CV XLRA MEAS LEFT ICA RATIO VEL: -89.9 CM/SEC
BH CV XLRA MEAS LEFT ICA/CCA RATIO: 0.97
BH CV XLRA MEAS LEFT MID ICA EDV: -30.9 CM/SEC
BH CV XLRA MEAS LEFT MID ICA PSV: -81.1 CM/SEC
BH CV XLRA MEAS LEFT PROX CCA EDV: 28.5 CM/SEC
BH CV XLRA MEAS LEFT PROX CCA PSV: 125.7 CM/SEC
BH CV XLRA MEAS LEFT PROX ECA EDV: -13.2 CM/SEC
BH CV XLRA MEAS LEFT PROX ECA PSV: -86.6 CM/SEC
BH CV XLRA MEAS LEFT PROX ICA EDV: -31.4 CM/SEC
BH CV XLRA MEAS LEFT PROX ICA PSV: -90.4 CM/SEC
BH CV XLRA MEAS LEFT VERTEBRAL A EDV: 23.3 CM/SEC
BH CV XLRA MEAS LEFT VERTEBRAL A PSV: 83.6 CM/SEC
BH CV XLRA MEAS RIGHT BULB EDV: -22 CM/SEC
BH CV XLRA MEAS RIGHT BULB PSV: -68.5 CM/SEC
BH CV XLRA MEAS RIGHT CCA RATIO VEL: -72.3 CM/SEC
BH CV XLRA MEAS RIGHT DIST CCA EDV: -27.5 CM/SEC
BH CV XLRA MEAS RIGHT DIST CCA PSV: -73.1 CM/SEC
BH CV XLRA MEAS RIGHT DIST ICA EDV: -21 CM/SEC
BH CV XLRA MEAS RIGHT DIST ICA PSV: -62.9 CM/SEC
BH CV XLRA MEAS RIGHT ICA RATIO VEL: -76.1 CM/SEC
BH CV XLRA MEAS RIGHT ICA/CCA RATIO: 1.1
BH CV XLRA MEAS RIGHT MID ICA EDV: -33.5 CM/SEC
BH CV XLRA MEAS RIGHT MID ICA PSV: -76.8 CM/SEC
BH CV XLRA MEAS RIGHT PROX CCA EDV: 25.1 CM/SEC
BH CV XLRA MEAS RIGHT PROX CCA PSV: 90.4 CM/SEC
BH CV XLRA MEAS RIGHT PROX ECA EDV: -24.4 CM/SEC
BH CV XLRA MEAS RIGHT PROX ECA PSV: -137.1 CM/SEC
BH CV XLRA MEAS RIGHT PROX ICA EDV: -28.6 CM/SEC
BH CV XLRA MEAS RIGHT PROX ICA PSV: -76.1 CM/SEC
BH CV XLRA MEAS RIGHT VERTEBRAL A EDV: 15.3 CM/SEC
BH CV XLRA MEAS RIGHT VERTEBRAL A PSV: 41.9 CM/SEC
MAXIMAL PREDICTED HEART RATE: 169 BPM
STRESS TARGET HR: 144 BPM

## 2020-07-07 ENCOUNTER — TELEPHONE (OUTPATIENT)
Dept: CARDIOLOGY | Facility: CLINIC | Age: 50
End: 2020-07-07

## 2020-07-07 NOTE — TELEPHONE ENCOUNTER
Echo:  Estimated EF appears to be in the range of 56 - 60%.    Duplex Carotid:  1.  The entire right carotid system is normal with no significant atherosclerotic involvement on echo and normal Doppler flow profiles.     2.  There is very minimal luminal irregularity in the bulb on the left with no flow perturbation by Doppler sampling.     3.  Antegrade flow in both vertebral arteries.     Summary: Very minimal and clearly nonobstructive carotid disease bilaterally.  Antegrade flow in both vertebral arteries.      Keep follow up on 7/21.

## 2020-07-29 ENCOUNTER — OFFICE VISIT (OUTPATIENT)
Dept: CARDIOLOGY | Facility: CLINIC | Age: 50
End: 2020-07-29

## 2020-07-29 VITALS
OXYGEN SATURATION: 97 % | DIASTOLIC BLOOD PRESSURE: 74 MMHG | WEIGHT: 225.8 LBS | SYSTOLIC BLOOD PRESSURE: 148 MMHG | HEART RATE: 63 BPM | BODY MASS INDEX: 36.29 KG/M2 | HEIGHT: 66 IN

## 2020-07-29 DIAGNOSIS — R00.2 PALPITATIONS: ICD-10-CM

## 2020-07-29 DIAGNOSIS — R06.02 SOB (SHORTNESS OF BREATH): ICD-10-CM

## 2020-07-29 DIAGNOSIS — I35.1 NONRHEUMATIC AORTIC VALVE INSUFFICIENCY: ICD-10-CM

## 2020-07-29 DIAGNOSIS — R07.9 CHEST PAIN, UNSPECIFIED TYPE: Primary | ICD-10-CM

## 2020-07-29 PROCEDURE — 99213 OFFICE O/P EST LOW 20 MIN: CPT | Performed by: PHYSICIAN ASSISTANT

## 2020-07-29 RX ORDER — LORAZEPAM 0.5 MG/1
0.5 TABLET ORAL DAILY
COMMUNITY
Start: 2020-06-22

## 2020-07-29 RX ORDER — FLUOXETINE 20 MG/1
TABLET, FILM COATED ORAL
COMMUNITY
Start: 2020-06-22 | End: 2021-05-19

## 2020-07-29 RX ORDER — FLUOXETINE 10 MG/1
CAPSULE ORAL
COMMUNITY
Start: 2020-06-22 | End: 2021-05-19

## 2020-07-29 NOTE — PATIENT INSTRUCTIONS
Steps to Quit Smoking  Smoking tobacco is the leading cause of preventable death. It can affect almost every organ in the body. Smoking puts you and people around you at risk for many serious, long-lasting (chronic) diseases. Quitting smoking can be hard, but it is one of the best things that you can do for your health. It is never too late to quit.  How do I get ready to quit?  When you decide to quit smoking, make a plan to help you succeed. Before you quit:  · Pick a date to quit. Set a date within the next 2 weeks to give you time to prepare.  · Write down the reasons why you are quitting. Keep this list in places where you will see it often.  · Tell your family, friends, and co-workers that you are quitting. Their support is important.  · Talk with your doctor about the choices that may help you quit.  · Find out if your health insurance will pay for these treatments.  · Know the people, places, things, and activities that make you want to smoke (triggers). Avoid them.  What first steps can I take to quit smoking?  · Throw away all cigarettes at home, at work, and in your car.  · Throw away the things that you use when you smoke, such as ashtrays and lighters.  · Clean your car. Make sure to empty the ashtray.  · Clean your home, including curtains and carpets.  What can I do to help me quit smoking?  Talk with your doctor about taking medicines and seeing a counselor at the same time. You are more likely to succeed when you do both.  · If you are pregnant or breastfeeding, talk with your doctor about counseling or other ways to quit smoking. Do not take medicine to help you quit smoking unless your doctor tells you to do so.  To quit smoking:  Quit right away  · Quit smoking totally, instead of slowly cutting back on how much you smoke over a period of time.  · Go to counseling. You are more likely to quit if you go to counseling sessions regularly.  Take medicine  You may take medicines to help you quit. Some  medicines need a prescription, and some you can buy over-the-counter. Some medicines may contain a drug called nicotine to replace the nicotine in cigarettes. Medicines may:  · Help you to stop having the desire to smoke (cravings).  · Help to stop the problems that come when you stop smoking (withdrawal symptoms).  Your doctor may ask you to use:  · Nicotine patches, gum, or lozenges.  · Nicotine inhalers or sprays.  · Non-nicotine medicine that is taken by mouth.  Find resources  Find resources and other ways to help you quit smoking and remain smoke-free after you quit. These resources are most helpful when you use them often. They include:  · Online chats with a counselor.  · Phone quitlines.  · Printed self-help materials.  · Support groups or group counseling.  · Text messaging programs.  · Mobile phone apps. Use apps on your mobile phone or tablet that can help you stick to your quit plan. There are many free apps for mobile phones and tablets as well as websites. Examples include Quit Guide from the CDC and smokefree.gov    What things can I do to make it easier to quit?    · Talk to your family and friends. Ask them to support and encourage you.  · Call a phone quitline (4-032-QUITNOW), reach out to support groups, or work with a counselor.  · Ask people who smoke to not smoke around you.  · Avoid places that make you want to smoke, such as:  ? Bars.  ? Parties.  ? Smoke-break areas at work.  · Spend time with people who do not smoke.  · Lower the stress in your life. Stress can make you want to smoke. Try these things to help your stress:  ? Getting regular exercise.  ? Doing deep-breathing exercises.  ? Doing yoga.  ? Meditating.  ? Doing a body scan. To do this, close your eyes, focus on one area of your body at a time from head to toe. Notice which parts of your body are tense. Try to relax the muscles in those areas.  How will I feel when I quit smoking?  Day 1 to 3 weeks  Within the first 24 hours,  you may start to have some problems that come from quitting tobacco. These problems are very bad 2-3 days after you quit, but they do not often last for more than 2-3 weeks. You may get these symptoms:  · Mood swings.  · Feeling restless, nervous, angry, or annoyed.  · Trouble concentrating.  · Dizziness.  · Strong desire for high-sugar foods and nicotine.  · Weight gain.  · Trouble pooping (constipation).  · Feeling like you may vomit (nausea).  · Coughing or a sore throat.  · Changes in how the medicines that you take for other issues work in your body.  · Depression.  · Trouble sleeping (insomnia).  Week 3 and afterward  After the first 2-3 weeks of quitting, you may start to notice more positive results, such as:  · Better sense of smell and taste.  · Less coughing and sore throat.  · Slower heart rate.  · Lower blood pressure.  · Clearer skin.  · Better breathing.  · Fewer sick days.  Quitting smoking can be hard. Do not give up if you fail the first time. Some people need to try a few times before they succeed. Do your best to stick to your quit plan, and talk with your doctor if you have any questions or concerns.  Summary  · Smoking tobacco is the leading cause of preventable death. Quitting smoking can be hard, but it is one of the best things that you can do for your health.  · When you decide to quit smoking, make a plan to help you succeed.  · Quit smoking right away, not slowly over a period of time.  · When you start quitting, seek help from your doctor, family, or friends.  This information is not intended to replace advice given to you by your health care provider. Make sure you discuss any questions you have with your health care provider.  Document Released: 10/14/2010 Document Revised: 03/06/2020 Document Reviewed: 03/07/2020  [employer] Patient Education © 2020 [employer]  How to Quarantine at Home  Information for Patients and Families    These instructions are for people with confirmed or  suspected COVID-19 who do not need to be hospitalized and those with confirmed COVID-19 who were hospitalized and discharged to care for themselves at home.    If you were tested through the Health Department  The Health Department will monitor your wellbeing.  If it is determined that you do not need to be hospitalized and can be isolated at home, you will be monitored by staff from your local or state health department.     If you were tested through a [facility]  You will need to monitor yourself and report changes in your symptoms to your doctor.  See the section below called Monitor Your Symptoms.    Follow these steps until a healthcare provider or local or state health department says you can return to your normal activities.    Stay home except to get medical care  • Restrict activities outside your home, except for getting medical care.   • Do not go to work, school, or public areas.   • Avoid using public transportation, ride-sharing, or taxis.    Separate yourself from other people and animals in your home  People  As much as possible, you should stay in a specific room and away from other people in your home. Also, you should use a separate bathroom, if available.    Animals  You should restrict contact with pets and other animals while you are sick with COVID-19, just like you would around other people. When possible, have another member of your household care for your animals while you are sick. If you are sick with COVID-19, avoid contact with your pet, including petting, snuggling, being kissed or licked, and sharing food. If you must care for your pet or be around animals while you are sick, wash your hands before and after you interact with pets and wear a facemask. See COVID-19 and Animals for more information.    Call ahead before visiting your doctor  If you have a medical appointment, call the healthcare provider and tell them that you have or may have COVID-19. This information will help  the healthcare provider’s office take steps to keep other people from getting infected or exposed.    Wear a facemask  You should wear a facemask when you are around other people (e.g., sharing a room or vehicle) or pets and before you enter a healthcare provider’s office.     If you are not able to wear a facemask (for example, because it causes trouble breathing), then people who live with you should not stay in the same room with you, or they should wear a facemask if they enter your room.    Cover your coughs and sneezes  • Cover your mouth and nose with a tissue when you cough or sneeze.   • Throw used tissues in a lined trash can.   • Immediately wash your hands with soap and water for at least 20 seconds or, if soap and water are not available, clean your hands with an alcohol-based hand  that contains at least 60% alcohol.    Clean your hands often  • Wash your hands often with soap and water for at least 20 seconds, especially after blowing your nose, coughing, or sneezing; going to the bathroom; and before eating or preparing food.     • If soap and water are not readily available, use an alcohol-based hand  with at least 60% alcohol, covering all surfaces of your hands and rubbing them together until they feel dry.    • Soap and water are the best option if hands are visibly dirty. Avoid touching your eyes, nose, and mouth with unwashed hands.    Avoid sharing personal household items  • You should not share dishes, drinking glasses, cups, eating utensils, towels, or bedding with other people or pets in your home.   • After using these items, they should be washed thoroughly with soap and water.    Clean all “high-touch” surfaces everyday  • High touch surfaces include counters, tabletops, doorknobs, bathroom fixtures, toilets, phones, keyboards, tablets, and bedside tables.   • Also, clean any surfaces that may have blood, stool, or body fluids on them.   • Use a household cleaning  spray or wipe, according to the label instructions. Labels contain instructions for safe and effective use of the cleaning product, including precautions you should take when applying the product, such as wearing gloves and making sure you have good ventilation during use of the product.    Monitor your symptoms  • Seek prompt medical attention if your illness is worsening (e.g., difficulty breathing).   • Before seeking care, call your healthcare provider and tell them that you have, or are being evaluated for, COVID-19.   • Put on a facemask before you enter the facility.     • These steps will help the healthcare provider’s office to keep other people in the office or waiting room from getting infected or exposed.   • Persons who are placed under active monitoring or facilitated self-monitoring should follow instructions provided by their local health department or occupational health professionals, as appropriate.  • If you have a medical emergency and need to call 911, notify the dispatch personnel that you have, or are being evaluated for COVID-19. If possible, put on a facemask before emergency medical services arrive.    Discontinuing home isolation  Patients with confirmed COVID-19 should remain under home isolation precautions until the risk of secondary transmission to others is thought to be low. The decision to discontinue home isolation precautions should be made on a case-by-case basis, in consultation with healthcare providers and state and local health departments.    The below content are for household members, intimate partners, and caregivers of a patient with symptomatic laboratory-confirmed COVID-19 or a patient under investigation:    Household members, intimate partners, and caregivers may have close contact with a person with symptomatic, laboratory-confirmed COVID-19 or a person under investigation.     Close contacts should monitor their health; they should call their healthcare provider  right away if they develop symptoms suggestive of COVID-19 (e.g., fever, cough, shortness of breath)     Close contacts should also follow these recommendations:  • Make sure that you understand and can help the patient follow their healthcare provider’s instructions for medication(s) and care. You should help the patient with basic needs in the home and provide support for getting groceries, prescriptions, and other personal needs.  • Monitor the patient’s symptoms. If the patient is getting sicker, call his or her healthcare provider and tell them that the patient has laboratory-confirmed COVID-19. This will help the healthcare provider’s office take steps to keep other people in the office or waiting room from getting infected. Ask the healthcare provider to call the local or state health department for additional guidance. If the patient has a medical emergency and you need to call 911, notify the dispatch personnel that the patient has, or is being evaluated for COVID-19.  • Household members should stay in another room or be  from the patient as much as possible. Household members should use a separate bedroom and bathroom, if available.  • Prohibit visitors who do not have an essential need to be in the home.  • Household members should care for any pets in the home. Do not handle pets or other animals while sick.  For more information, see COVID-19 and Animals.  • Make sure that shared spaces in the home have good air flow, such as by an air conditioner or an opened window, weather permitting.  • Perform hand hygiene frequently. Wash your hands often with soap and water for at least 20 seconds or use an alcohol-based hand  that contains 60 to 95% alcohol, covering all surfaces of your hands and rubbing them together until they feel dry. Soap and water should be used preferentially if hands are visibly dirty.  • Avoid touching your eyes, nose, and mouth with unwashed hands.  • The patient  should wear a facemask when you are around other people. If the patient is not able to wear a facemask (for example, because it causes trouble breathing), you, as the caregiver, should wear a mask when you are in the same room as the patient.  • Wear a disposable facemask and gloves when you touch or have contact with the patient’s blood, stool, or body fluids, such as saliva, sputum, nasal mucus, vomit, or urine.   o Throw out disposable facemasks and gloves after using them. Do not reuse.  o When removing personal protective equipment, first remove and dispose of gloves. Then, immediately clean your hands with soap and water or alcohol-based hand . Next, remove and dispose of facemask, and immediately clean your hands again with soap and water or alcohol-based hand .  • Avoid sharing household items with the patient. You should not share dishes, drinking glasses, cups, eating utensils, towels, bedding, or other items. After the patient uses these items, you should wash them thoroughly (see below “Wash laundry thoroughly”).  • Clean all “high-touch” surfaces, such as counters, tabletops, doorknobs, bathroom fixtures, toilets, phones, keyboards, tablets, and bedside tables, every day. Also, clean any surfaces that may have blood, stool, or body fluids on them.   o Use a household cleaning spray or wipe, according to the label instructions. Labels contain instructions for safe and effective use of the cleaning product including precautions you should take when applying the product, such as wearing gloves and making sure you have good ventilation during use of the product.  • Wash laundry thoroughly.   o Immediately remove and wash clothes or bedding that have blood, stool, or body fluids on them.  o Wear disposable gloves while handling soiled items and keep soiled items away from your body. Clean your hands (with soap and water or an alcohol-based hand ) immediately after removing your  gloves.  o Read and follow directions on labels of laundry or clothing items and detergent. In general, using a normal laundry detergent according to washing machine instructions and dry thoroughly using the warmest temperatures recommended on the clothing label.  • Place all used disposable gloves, facemasks, and other contaminated items in a lined container before disposing of them with other household waste. Clean your hands (with soap and water or an alcohol-based hand ) immediately after handling these items. Soap and water should be used preferentially if hands are visibly dirty.  • Discuss any additional questions with your state or local health department or healthcare provider.    Adapted from information provided by the Centers for Disease Control and Prevention.  For more information, visit https://www.cdc.gov/coronavirus/2019-ncov/hcp/guidance-prevent-spread.htmlFat and Cholesterol Restricted Eating Plan  Getting too much fat and cholesterol in your diet may cause health problems. Choosing the right foods helps keep your fat and cholesterol at normal levels. This can keep you from getting certain diseases.  Your doctor may recommend an eating plan that includes:  · Total fat: ______% or less of total calories a day.  · Saturated fat: ______% or less of total calories a day.  · Cholesterol: less than _________mg a day.  · Fiber: ______g a day.  What are tips for following this plan?  Meal planning  · At meals, divide your plate into four equal parts:  ? Fill one-half of your plate with vegetables and green salads.  ? Fill one-fourth of your plate with whole grains.  ? Fill one-fourth of your plate with low-fat (lean) protein foods.  · Eat fish that is high in omega-3 fats at least two times a week. This includes mackerel, tuna, sardines, and salmon.  · Eat foods that are high in fiber, such as whole grains, beans, apples, broccoli, carrots, peas, and barley.  General tips    · Work with your  "doctor to lose weight if you need to.  · Avoid:  ? Foods with added sugar.  ? Fried foods.  ? Foods with partially hydrogenated oils.  · Limit alcohol intake to no more than 1 drink a day for nonpregnant women and 2 drinks a day for men. One drink equals 12 oz of beer, 5 oz of wine, or 1½ oz of hard liquor.  Reading food labels  · Check food labels for:  ? Trans fats.  ? Partially hydrogenated oils.  ? Saturated fat (g) in each serving.  ? Cholesterol (mg) in each serving.  ? Fiber (g) in each serving.  · Choose foods with healthy fats, such as:  ? Monounsaturated fats.  ? Polyunsaturated fats.  ? Omega-3 fats.  · Choose grain products that have whole grains. Look for the word \"whole\" as the first word in the ingredient list.  Cooking  · Cook foods using low-fat methods. These include baking, boiling, grilling, and broiling.  · Eat more home-cooked foods. Eat at restaurants and buffets less often.  · Avoid cooking using saturated fats, such as butter, cream, palm oil, palm kernel oil, and coconut oil.  Recommended foods    Fruits  · All fresh, canned (in natural juice), or frozen fruits.  Vegetables  · Fresh or frozen vegetables (raw, steamed, roasted, or grilled). Green salads.  Grains  · Whole grains, such as whole wheat or whole grain breads, crackers, cereals, and pasta. Unsweetened oatmeal, bulgur, barley, quinoa, or brown rice. Corn or whole wheat flour tortillas.  Meats and other protein foods  · Ground beef (85% or leaner), grass-fed beef, or beef trimmed of fat. Skinless chicken or turkey. Ground chicken or turkey. Pork trimmed of fat. All fish and seafood. Egg whites. Dried beans, peas, or lentils. Unsalted nuts or seeds. Unsalted canned beans. Nut butters without added sugar or oil.  Dairy  · Low-fat or nonfat dairy products, such as skim or 1% milk, 2% or reduced-fat cheeses, low-fat and fat-free ricotta or cottage cheese, or plain low-fat and nonfat yogurt.  Fats and oils  · Tub margarine without trans " fats. Light or reduced-fat mayonnaise and salad dressings. Avocado. Olive, canola, sesame, or safflower oils.  The items listed above may not be a complete list of foods and beverages you can eat. Contact a dietitian for more information.  Foods to avoid  Fruits  · Canned fruit in heavy syrup. Fruit in cream or butter sauce. Fried fruit.  Vegetables  · Vegetables cooked in cheese, cream, or butter sauce. Fried vegetables.  Grains  · White bread. White pasta. White rice. Cornbread. Bagels, pastries, and croissants. Crackers and snack foods that contain trans fat and hydrogenated oils.  Meats and other protein foods  · Fatty cuts of meat. Ribs, chicken wings, jeffrey, sausage, bologna, salami, chitterlings, fatback, hot dogs, bratwurst, and packaged lunch meats. Liver and organ meats. Whole eggs and egg yolks. Chicken and turkey with skin. Fried meat.  Dairy  · Whole or 2% milk, cream, half-and-half, and cream cheese. Whole milk cheeses. Whole-fat or sweetened yogurt. Full-fat cheeses. Nondairy creamers and whipped toppings. Processed cheese, cheese spreads, and cheese curds.  Beverages  · Alcohol. Sugar-sweetened drinks such as sodas, lemonade, and fruit drinks.  Fats and oils  · Butter, stick margarine, lard, shortening, ghee, or jeffrey fat. Coconut, palm kernel, and palm oils.  Sweets and desserts  · Corn syrup, sugars, honey, and molasses. Candy. Jam and jelly. Syrup. Sweetened cereals. Cookies, pies, cakes, donuts, muffins, and ice cream.  The items listed above may not be a complete list of foods and beverages you should avoid. Contact a dietitian for more information.  Summary  · Choosing the right foods helps keep your fat and cholesterol at normal levels. This can keep you from getting certain diseases.  · At meals, fill one-half of your plate with vegetables and green salads.  · Eat high-fiber foods, like whole grains, beans, apples, carrots, peas, and barley.  · Limit added sugar, saturated fats, alcohol, and  "fried foods.  This information is not intended to replace advice given to you by your health care provider. Make sure you discuss any questions you have with your health care provider.  Document Released: 06/18/2013 Document Revised: 08/21/2019 Document Reviewed: 09/04/2018  [employer] Patient Education © 2020 [employer]  BMI for Adults    Body mass index (BMI) is a number that is calculated from a person's weight and height. BMI may help to estimate how much of a person's weight is composed of fat. BMI can help identify those who may be at higher risk for certain medical problems.  How is BMI used with adults?  BMI is used as a screening tool to identify possible weight problems. It is used to check whether a person is obese, overweight, healthy weight, or underweight.  How is BMI calculated?  BMI measures your weight and compares it to your height. This can be done either in English ([place] or metric measurements. Note that charts are available to help you find your BMI quickly and easily without having to do these calculations yourself.  To calculate your BMI in English ([place] measurements, your health care provider will:  1. Measure your weight in pounds (lb).  2. Multiply the number of pounds by 703.  ? For example, for a person who weighs 180 lb, multiply that number by 703, which equals 126,540.  3. Measure your height in inches (in). Then multiply that number by itself to get a measurement called \"inches squared.\"  ? For example, for a person who is 70 in tall, the \"inches squared\" measurement is 70 in x 70 in, which equals 4900 inches squared.  4. Divide the total from Step 2 (number of lb x 703) by the total from Step 3 (inches squared): 126,540 ÷ 4900 = 25.8. This is your BMI.  To calculate your BMI in metric measurements, your health care provider will:  1. Measure your weight in kilograms (kg).  2. Measure your height in meters (m). Then multiply that number by itself to get a measurement called \"meters " "squared.\"  ? For example, for a person who is 1.75 m tall, the \"meters squared\" measurement is 1.75 m x 1.75 m, which is equal to 3.1 meters squared.  3. Divide the number of kilograms (your weight) by the meters squared number. In this example: 70 ÷ 3.1 = 22.6. This is your BMI.  How is BMI interpreted?  To interpret your results, your health care provider will use BMI charts to identify whether you are underweight, normal weight, overweight, or obese. The following guidelines will be used:  · Underweight: BMI less than 18.5.  · Normal weight: BMI between 18.5 and 24.9.  · Overweight: BMI between 25 and 29.9.  · Obese: BMI of 30 and above.  Please note:  · Weight includes both fat and muscle, so someone with a muscular build, such as an athlete, may have a BMI that is higher than 24.9. In cases like these, BMI is not an accurate measure of body fat.  · To determine if excess body fat is the cause of a BMI of 25 or higher, further assessments may need to be done by a health care provider.  · BMI is usually interpreted in the same way for men and women.  Why is BMI a useful tool?  BMI is useful in two ways:  · Identifying a weight problem that may be related to a medical condition, or that may increase the risk for medical problems.  · Promoting lifestyle and diet changes in order to reach a healthy weight.  Summary  · Body mass index (BMI) is a number that is calculated from a person's weight and height.  · BMI may help to estimate how much of a person's weight is composed of fat. BMI can help identify those who may be at higher risk for certain medical problems.  · BMI can be measured using English measurements or metric measurements.  · To interpret your results, your health care provider will use BMI charts to identify whether you are underweight, normal weight, overweight, or obese.  This information is not intended to replace advice given to you by your health care provider. Make sure you discuss any questions " you have with your health care provider.  Document Released: 08/29/2005 Document Revised: 11/30/2018 Document Reviewed: 10/31/2018  Elsevier Patient Education © 2020 [employer]

## 2020-07-29 NOTE — PROGRESS NOTES
"Problem list     Subjective   Kirstie Jackson is a 49 y.o. female     Chief Complaint   Patient presents with   • Follow-up   Problem List:      1. Nonobstructive CAD per catheterization, 2011.  1.1 Stress test 4/6/16 - indeterminate risk due to inadequate time on the treadmill; poor exercise capacity  1.2 low risk stress test October 2016  2. Mild peripheral vascular disease per arterial duplex of the lower extremities, January 2012.  3. Hypertension  4. Dyslipidemia  5. Chronic palpitations likely secondary to anxiety and stress.  5.1 Event Monitor 10/21-11/3 - NSR  6. Smoking Habituation  7.  Chronic venous insufficiency    HPI    Patient is a 49-year-old female that presents back to the office for follow-up.  She is here today to review carotid duplex as well as echocardiogram.  Carotid duplex suggested no significant atheroma involving both carotids.  Echo suggested good LV function, mild AI but otherwise largely unremarkable    She feels well.  Occasionally she will get sharp pains in the substernal parasternal region that starts as a slight discomfort and then starts \"shooting\" peripherally.  This is reproduced upon palpation of the chest wall according to the patient.    She has mild levels of dyspnea but no progressive shortness of breath.  No PND orthopnea.    She will occasionally feel palpitations at random times.  Nothing that has been progressive.  She does not describe associated dizziness presyncope or syncope and otherwise is doing well      Current Outpatient Medications on File Prior to Visit   Medication Sig Dispense Refill   • atorvastatin (LIPITOR) 20 MG tablet Take 1 tablet by mouth Daily. 30 tablet 11   • cyanocobalamin 1000 MCG/ML injection INJECT 1 ML INTRAMUSCULARLY ONCE A MONTH     • cyclobenzaprine (FLEXERIL) 10 MG tablet Take  by mouth as needed.     • famotidine (PEPCID) 40 MG tablet Take 40 mg by mouth 2 (Two) Times a Day.     • fexofenadine (ALLEGRA ALLERGY) 180 MG tablet Take 1 " tablet by mouth daily.     • FLUoxetine (PROzac) 10 MG capsule TAKE 1 CAPSULE BY MOUTH EVERY DAY FOR 2 WEEKS     • FLUoxetine (PROzac) 20 MG tablet TAKE 1 TABLET BY MOUTH EVERY DAY FOR 14 DAYS     • gabapentin (NEURONTIN) 600 MG tablet Take 300 mg by mouth at night.  0   • HYDROcodone-acetaminophen (NORCO) 7.5-325 MG per tablet prn     • LORazepam (ATIVAN) 0.5 MG tablet Take 0.5 mg by mouth Daily.     • methocarbamol (ROBAXIN) 500 MG tablet Take 500 mg by mouth As Needed for Muscle Spasms.     • metoprolol tartrate (LOPRESSOR) 50 MG tablet Take 1 tablet by mouth Every 12 (Twelve) Hours. 180 tablet 2   • montelukast (SINGULAIR) 10 MG tablet Take 10 mg by mouth Every Night.     • nitroglycerin (NITROSTAT) 0.4 MG SL tablet Place  under the tongue. PLACE 1 TABLET UNDER THE TONGUE EVERY 5 MINUTES FOR UP TO 3 DOSES AS NEEDED FOR CHEST PAIN. CALL 911 IF PAIN PERSISTS.       No current facility-administered medications on file prior to visit.        Amoxicillin and Codeine    Past Medical History:   Diagnosis Date   • Anxiety    • Asthma    • Atypical chest pain    • CAD (coronary artery disease)     2-vessel   • Chronic headaches    • Dyslipidemia    • Dyspnea    • GERD (gastroesophageal reflux disease)    • H/O cardiac catheterization 2011   • H/O: osteoarthritis    • History of peripheral arterial disease    • Hyperlipidemia    • Hypertension    • Osteoarthritis    • Palpitations    • Peripheral artery disease (CMS/HCC)    • Seasonal allergies    • Sternocleidomastoid muscle tenderness    • Vitamin B12 deficiency (non anemic)        Social History     Socioeconomic History   • Marital status:      Spouse name: Not on file   • Number of children: Not on file   • Years of education: Not on file   • Highest education level: Not on file   Tobacco Use   • Smoking status: Current Every Day Smoker     Packs/day: 0.50     Types: Cigarettes   • Smokeless tobacco: Never Used   Substance and Sexual Activity   • Alcohol use:  "No   • Drug use: No       Family History   Problem Relation Age of Onset   • Heart attack Father    • Other Father         CABG   • Hypertension Father    • Heart attack Paternal Grandfather    • No Known Problems Mother        Review of Systems   Constitutional: Positive for fatigue.   HENT: Positive for postnasal drip. Negative for congestion, rhinorrhea and sore throat.    Respiratory: Positive for cough (States she's currently on z-pack for URI ) and shortness of breath (w/ increased activity). Negative for chest tightness.    Cardiovascular: Positive for chest pain, palpitations and leg swelling (BLE edema, goes down some overnight ).   Gastrointestinal: Negative for abdominal pain, blood in stool, constipation, diarrhea, nausea and vomiting.   Endocrine: Negative for cold intolerance and heat intolerance.   Genitourinary: Positive for frequency. Negative for difficulty urinating, dysuria, hematuria and urgency.   Musculoskeletal: Positive for arthralgias, back pain and neck pain.   Skin: Negative for rash and wound.   Allergic/Immunologic: Positive for environmental allergies (allergies ). Negative for food allergies.   Neurological: Positive for headaches. Negative for dizziness, syncope, weakness, light-headedness and numbness.   Hematological: Does not bruise/bleed easily.   Psychiatric/Behavioral: Negative for sleep disturbance.   All other systems reviewed and are negative.      Objective   Vitals:    07/29/20 0951   BP: 148/74   Pulse: 63   SpO2: 97%   Weight: 102 kg (225 lb 12.8 oz)   Height: 167.6 cm (66\")      /74   Pulse 63   Ht 167.6 cm (66\")   Wt 102 kg (225 lb 12.8 oz)   SpO2 97%   BMI 36.45 kg/m²     Lab Results (most recent)     None          Physical Exam   Constitutional: She is oriented to person, place, and time. She appears well-developed and well-nourished. No distress.   HENT:   Head: Normocephalic and atraumatic.   Eyes: Conjunctivae are normal. Right eye exhibits no " discharge. Left eye exhibits no discharge. No scleral icterus.   Neck: No JVD present.   Cardiovascular: Normal rate, regular rhythm and normal heart sounds. Exam reveals no gallop and no friction rub.   No murmur heard.  Grade 1/6 systolic murmur right upper sternal border   Pulmonary/Chest: Effort normal and breath sounds normal. No respiratory distress. She has no wheezes. She has no rales. She exhibits no tenderness.   Musculoskeletal: Normal range of motion. She exhibits no edema.   Neurological: She is alert and oriented to person, place, and time. No cranial nerve deficit.   Skin: Skin is warm and dry. No rash noted. No erythema. No pallor.   Psychiatric: She has a normal mood and affect. Her behavior is normal.   Nursing note and vitals reviewed.      Procedure   Procedures       Assessment/Plan     Problems Addressed this Visit        Cardiovascular and Mediastinum    Palpitations    Nonrheumatic aortic valve insufficiency       Respiratory    SOB (shortness of breath)       Nervous and Auditory    Chest pain - Primary          Recommendation  1.  Patient with atypical chest pain that is reproduced upon palpation with stress test in 2016 been unremarkable.  At this point, based on the atypical nature of her symptoms, I do not feel further evaluation is warranted unless symptoms were to change as discussed with the patient    2.  Dyspnea is mild and I feel physical deconditioning and weight is likely contributing    3.  Patient with palpitations but no symptoms of cerebral embolic events no symptoms of dizziness presyncope or syncope.  For now we will monitor.  We will see her back for follow-up in 6 months or year.  She will follow with primary scheduled           Kirstie Jackson  reports that she has been smoking cigarettes. She has been smoking about 0.50 packs per day. She has never used smokeless tobacco.. I have educated her on the risk of diseases from using tobacco products such as cancer, COPD and  heart diease.     I advised her to quit and she is not willing to quit.       Patient's Body mass index is 36.45 kg/m². BMI is above normal parameters. Recommendations include: educational material.       Electronically signed by:

## 2021-02-01 RX ORDER — ATORVASTATIN CALCIUM 20 MG/1
TABLET, FILM COATED ORAL
Qty: 90 TABLET | Refills: 0 | Status: SHIPPED | OUTPATIENT
Start: 2021-02-01 | End: 2021-04-30

## 2021-04-30 RX ORDER — ATORVASTATIN CALCIUM 20 MG/1
TABLET, FILM COATED ORAL
Qty: 90 TABLET | Refills: 0 | Status: SHIPPED | OUTPATIENT
Start: 2021-04-30 | End: 2021-05-20

## 2021-05-19 ENCOUNTER — OFFICE VISIT (OUTPATIENT)
Dept: CARDIOLOGY | Facility: CLINIC | Age: 51
End: 2021-05-19

## 2021-05-19 VITALS
WEIGHT: 232.6 LBS | HEIGHT: 66 IN | OXYGEN SATURATION: 98 % | BODY MASS INDEX: 37.38 KG/M2 | HEART RATE: 65 BPM | DIASTOLIC BLOOD PRESSURE: 81 MMHG | SYSTOLIC BLOOD PRESSURE: 135 MMHG

## 2021-05-19 DIAGNOSIS — R06.02 SOB (SHORTNESS OF BREATH): ICD-10-CM

## 2021-05-19 DIAGNOSIS — R00.2 PALPITATIONS: ICD-10-CM

## 2021-05-19 DIAGNOSIS — R07.9 CHEST PAIN, UNSPECIFIED TYPE: Primary | ICD-10-CM

## 2021-05-19 PROCEDURE — 93000 ELECTROCARDIOGRAM COMPLETE: CPT | Performed by: NURSE PRACTITIONER

## 2021-05-19 PROCEDURE — 99214 OFFICE O/P EST MOD 30 MIN: CPT | Performed by: NURSE PRACTITIONER

## 2021-05-19 RX ORDER — FLUOXETINE HYDROCHLORIDE 40 MG/1
40 CAPSULE ORAL DAILY
COMMUNITY

## 2021-05-19 RX ORDER — BUSPIRONE HYDROCHLORIDE 5 MG/1
5 TABLET ORAL 2 TIMES DAILY
COMMUNITY

## 2021-05-19 RX ORDER — ONDANSETRON HYDROCHLORIDE 8 MG/1
8 TABLET, FILM COATED ORAL EVERY 8 HOURS PRN
COMMUNITY

## 2021-05-19 NOTE — PROGRESS NOTES
Subjective     Kirstie Jackson is a 50 y.o. female who presents to day for Follow-up.    CHIEF COMPLIANT  Chief Complaint   Patient presents with   • Follow-up       Active Problems:  Problem List Items Addressed This Visit        Cardiac and Vasculature    Chest pain - Primary    Relevant Orders    ECG 12 Lead    Stress Test With Myocardial Perfusion One Day    CBC & Differential    Comprehensive Metabolic Panel    TSH    Lipid Panel    Magnesium    Palpitations    Overview     5. Chronic palpitations likely secondary to anxiety and stress.         Relevant Orders    ECG 12 Lead    Stress Test With Myocardial Perfusion One Day    CBC & Differential    Comprehensive Metabolic Panel    TSH    Lipid Panel    Magnesium       Pulmonary and Pneumonias    SOB (shortness of breath)    Relevant Orders    ECG 12 Lead    Stress Test With Myocardial Perfusion One Day    CBC & Differential    Comprehensive Metabolic Panel    TSH    Lipid Panel    Magnesium      Problem List:      1. Nonobstructive CAD per catheterization, 2011.  1.1 Stress test 4/6/16 - indeterminate risk due to inadequate time on the treadmill; poor exercise capacity  1.2 low risk stress test October 2016  2. Mild peripheral vascular disease per arterial duplex of the lower extremities, January 2012.  3. Hypertension  4. Dyslipidemia  5. Chronic palpitations likely secondary to anxiety and stress.  5.1 Event Monitor 10/21-11/3 - NSR  6. Smoking Habituation  7.  Chronic venous insufficiency    HPI  HPI  Ms. Jackson is a 50-year-old female patient who is being followed up today for chest pain and palpitations.  Patient does report midsternal chest pain that occurs intermittently which she describes as a tight shooting pain followed by soreness type sensation.  She says she was on her way to school and she developed the chest pain.  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  She says that she does have associated shortness of breath and that it does seem to be worse with activity.  She says the anxiety seems to play a major role as well.  She also has a soreness in the mid sternum that is more constant than the other sensations.  She does have shortness of breath in which occurs at random and she feels is associated with anxiety as well.  She describes palpitations as a pounding in her back that radiates into her chest that occurs intermittently.  She also describes a racing type sensation in her chest.  She says at times it feels like her heart is going 55 miles an hour.  She does have associated shortness of breath and have sweaty palms.  She also contributes a lot of her palpitations due to anxiety and stress at work.  Patient also has lower extremity edema that mainly around in her feet and around her ankles.  She says this occurs if she sits for prolonged periods of time.  She says she does go for periods of time where she has no swelling and then that reoccurs.  It usually does decrease overnight but not all the way when the swelling is present.  She does report fatigue where she is tired all the time and gets tired very easily.  Dizziness lightheadedness occurs especially when she is leaning over or standing up too fast.  She says she even feels off balance at times.  Patient does have a history of chronic arterial hypertension in which her blood pressure is well controlled today at 135/81 heart rate is 65.  She does have associated headaches.  She is on metoprolol 50 mg not only for palpitations but for chronic arterial hypertension as well.  She denies any PND, orthopnea, or other neurological changes.  PRIOR MEDS  Current Outpatient Medications on File Prior to Visit   Medication Sig Dispense Refill   • busPIRone (BUSPAR) 5 MG tablet Take 5 mg by mouth 2 (two) times a day.     • cyanocobalamin 1000 MCG/ML injection INJECT 1 ML INTRAMUSCULARLY ONCE A MONTH     •  cyclobenzaprine (FLEXERIL) 10 MG tablet Take  by mouth as needed.     • famotidine (PEPCID) 40 MG tablet Take 40 mg by mouth 2 (Two) Times a Day.     • fexofenadine (ALLEGRA ALLERGY) 180 MG tablet Take 1 tablet by mouth daily.     • FLUoxetine (PROzac) 40 MG capsule Take 40 mg by mouth Daily.     • gabapentin (NEURONTIN) 300 MG capsule Take 300 mg by mouth Tonight.  0   • HYDROcodone-acetaminophen (NORCO)  MG per tablet prn     • LORazepam (ATIVAN) 0.5 MG tablet Take 0.5 mg by mouth Daily.     • metoprolol tartrate (LOPRESSOR) 50 MG tablet Take 1 tablet by mouth Every 12 (Twelve) Hours. 180 tablet 2   • montelukast (SINGULAIR) 10 MG tablet Take 10 mg by mouth Every Night.     • ondansetron (ZOFRAN) 8 MG tablet Take 8 mg by mouth Every 8 (Eight) Hours As Needed for Nausea or Vomiting.     • methocarbamol (ROBAXIN) 500 MG tablet Take 500 mg by mouth As Needed for Muscle Spasms.     • nitroglycerin (NITROSTAT) 0.4 MG SL tablet Place  under the tongue. PLACE 1 TABLET UNDER THE TONGUE EVERY 5 MINUTES FOR UP TO 3 DOSES AS NEEDED FOR CHEST PAIN. CALL 911 IF PAIN PERSISTS.     • [DISCONTINUED] atorvastatin (LIPITOR) 20 MG tablet TAKE 1 TABLET BY MOUTH EVERY DAY 90 tablet 0     No current facility-administered medications on file prior to visit.       ALLERGIES  Amoxicillin and Codeine    HISTORY  Past Medical History:   Diagnosis Date   • Anxiety    • Anxiety    • Arthritis    • Asthma    • Atypical chest pain    • CAD (coronary artery disease)     2-vessel   • Chronic headaches    • Depression    • Dyslipidemia    • Dyspnea    • GERD (gastroesophageal reflux disease)    • H/O cardiac catheterization 2011   • H/O: osteoarthritis    • History of peripheral arterial disease    • Hyperlipidemia    • Hypertension    • Osteoarthritis    • Palpitations    • Peripheral artery disease (CMS/HCC)    • PTSD (post-traumatic stress disorder)    • Seasonal allergies    • Sternocleidomastoid muscle tenderness    • Vitamin B12  "deficiency (non anemic)        Social History     Socioeconomic History   • Marital status:      Spouse name: Not on file   • Number of children: Not on file   • Years of education: Not on file   • Highest education level: Not on file   Tobacco Use   • Smoking status: Current Every Day Smoker     Packs/day: 0.50     Types: Cigarettes   • Smokeless tobacco: Never Used   • Tobacco comment: 7 cigarettes daily   Substance and Sexual Activity   • Alcohol use: No   • Drug use: No       Family History   Problem Relation Age of Onset   • Heart attack Father    • Other Father         CABG   • Hypertension Father    • Heart attack Paternal Grandfather    • No Known Problems Mother        Review of Systems   Constitutional: Positive for chills, fatigue and fever (frequent low grade fever).   HENT: Positive for rhinorrhea and sore throat. Negative for congestion.    Eyes: Positive for visual disturbance (reading glasses).   Respiratory: Positive for chest tightness and shortness of breath. Negative for apnea.    Cardiovascular: Positive for chest pain, palpitations and leg swelling.   Gastrointestinal: Negative.    Endocrine: Negative.    Genitourinary: Negative.    Musculoskeletal: Positive for arthralgias, back pain, neck pain and neck stiffness. Negative for gait problem.   Skin: Negative.  Negative for rash and wound.   Allergic/Immunologic: Positive for environmental allergies. Negative for food allergies.   Neurological: Positive for dizziness, weakness, light-headedness, numbness (toes) and headaches.   Hematological: Bruises/bleeds easily (bleed).   Psychiatric/Behavioral: Negative for sleep disturbance. The patient is nervous/anxious.        Objective     VITALS: /81 (BP Location: Left arm, Patient Position: Sitting)   Pulse 65   Ht 167.6 cm (65.98\")   Wt 106 kg (232 lb 9.6 oz)   SpO2 98%   BMI 37.56 kg/m²     LABS:   Lab Results (most recent)     None          IMAGING:   No Images in the past 120 " days found..    EXAM:  Physical Exam  Vitals and nursing note reviewed.   Constitutional:       Appearance: She is well-developed.   HENT:      Head: Normocephalic.   Eyes:      Pupils: Pupils are equal, round, and reactive to light.   Neck:      Thyroid: No thyroid mass.      Vascular: Carotid bruit present. No JVD.      Trachea: Trachea and phonation normal.   Cardiovascular:      Rate and Rhythm: Normal rate and regular rhythm.      Pulses:           Radial pulses are 2+ on the right side and 2+ on the left side.        Posterior tibial pulses are 2+ on the right side and 2+ on the left side.      Heart sounds: Murmur heard.   No friction rub. No gallop.    Pulmonary:      Effort: Pulmonary effort is normal. No respiratory distress.      Breath sounds: Normal breath sounds. No wheezing or rales.   Abdominal:      General: Bowel sounds are normal. There is no distension or abdominal bruit.      Palpations: Abdomen is soft.      Tenderness: There is no abdominal tenderness.   Musculoskeletal:         General: Swelling (trace) present. Normal range of motion.      Cervical back: Neck supple.   Skin:     General: Skin is warm and dry.      Capillary Refill: Capillary refill takes less than 2 seconds.      Findings: No rash.   Neurological:      Mental Status: She is alert and oriented to person, place, and time.      Cranial Nerves: No cranial nerve deficit.      Sensory: No sensory deficit.   Psychiatric:         Speech: Speech normal.         Behavior: Behavior normal.         Thought Content: Thought content normal.         Judgment: Judgment normal.         Procedure     ECG 12 Lead    Date/Time: 5/19/2021 2:50 PM  Performed by: Oscar Meadows APRN  Authorized by: Oscar Meadows APRN   Comparison: compared with previous ECG from 6/11/2020  Similar to previous ECG  Rhythm: sinus bradycardia  Rate: normal  BPM: 59  QRS axis: normal  Comments:  ms  No acute changes               Assessment/Plan     Diagnosis Plan   1. Chest pain, unspecified type  ECG 12 Lead    Stress Test With Myocardial Perfusion One Day    CBC & Differential    Comprehensive Metabolic Panel    TSH    Lipid Panel    Magnesium   2. SOB (shortness of breath)  ECG 12 Lead    Stress Test With Myocardial Perfusion One Day    CBC & Differential    Comprehensive Metabolic Panel    TSH    Lipid Panel    Magnesium   3. Palpitations  ECG 12 Lead    Stress Test With Myocardial Perfusion One Day    CBC & Differential    Comprehensive Metabolic Panel    TSH    Lipid Panel    Magnesium   1.  Due to patient increasing chest pain and shortness of breath I do feel it is appropriate to evaluate for ischemia including stress test and echocardiogram.  Patient has had a treadmill stress test in the past in which she was not able to reach target heart rate due to shortness of air and fatigue.  She had to have a pharmacological nuclear stress test to follow due to incomplete study.  Therefore I do feel it is appropriate to move forth with a Lexiscan stress test for further evaluation at this time.  She does have comorbidities including previous coronary artery disease by left heart catheterization in 2011 with nonobstructive coronary artery disease, hypertension, hyperlipidemia, and peripheral vascular disease with moderate small vessel disease on the left.  2.  Patient does have a history of hyperlipidemia in which we will repeat her lipid panel.  She has stopped her atorvastatin on her own and we will reevaluate her lipid panel determine the need for the a atorvastatin.  I do feel like she needs to be on statin therapy due to the fact of her history of not only peripheral vascular disease but nonobstructive coronary artery disease as well.  We will further discuss on follow-up and review the lipid panel.  3.  I would like to perform an extensive laboratory work-up including CBC, CMP, TSH, and magnesium to look for other potential causes of her symptoms.  4.   Patient's blood pressure is well controlled on current blood pressure medication regimen.  No medication changes are warranted at this time.  Patient advised to monitor blood pressure on a daily basis and report any persistent highs or lows.  Set goal blood pressure for patient at 130/80 or below.  5.  Informed of signs and symptoms of ACS and advised to seek emergent treatment for any new worsening symptoms.  Patient also advised sooner follow-up as needed.  Also advised to follow-up with family doctor as needed  This note is dictated utilizing voice recognition software.  Although this record has been proof read, transcriptional errors may still be present. If questions occur regarding the content of this record please do not hesitate to call our office.  I have reviewed and confirmed the accuracy of the ROS as documented by the MA/LPN/RN ZOHREH Moralez  Return in about 3 months (around 8/19/2021), or if symptoms worsen or fail to improve.    Diagnoses and all orders for this visit:    1. Chest pain, unspecified type (Primary)  -     ECG 12 Lead  -     Stress Test With Myocardial Perfusion One Day; Future  -     CBC & Differential; Future  -     Comprehensive Metabolic Panel; Future  -     TSH; Future  -     Lipid Panel; Future  -     Magnesium; Future    2. SOB (shortness of breath)  -     ECG 12 Lead  -     Stress Test With Myocardial Perfusion One Day; Future  -     CBC & Differential; Future  -     Comprehensive Metabolic Panel; Future  -     TSH; Future  -     Lipid Panel; Future  -     Magnesium; Future    3. Palpitations  -     ECG 12 Lead  -     Stress Test With Myocardial Perfusion One Day; Future  -     CBC & Differential; Future  -     Comprehensive Metabolic Panel; Future  -     TSH; Future  -     Lipid Panel; Future  -     Magnesium; Future                 MEDS ORDERED DURING VISIT:  No orders of the defined types were placed in this encounter.          This document has been electronically  signed by Oscar Meadows Jr., APRN  May 20, 2021 23:43 EDT

## 2021-05-19 NOTE — PATIENT INSTRUCTIONS
Steps to Quit Smoking  Smoking tobacco is the leading cause of preventable death. It can affect almost every organ in the body. Smoking puts you and people around you at risk for many serious, long-lasting (chronic) diseases. Quitting smoking can be hard, but it is one of the best things that you can do for your health. It is never too late to quit.  How do I get ready to quit?  When you decide to quit smoking, make a plan to help you succeed. Before you quit:  · Pick a date to quit. Set a date within the next 2 weeks to give you time to prepare.  · Write down the reasons why you are quitting. Keep this list in places where you will see it often.  · Tell your family, friends, and co-workers that you are quitting. Their support is important.  · Talk with your doctor about the choices that may help you quit.  · Find out if your health insurance will pay for these treatments.  · Know the people, places, things, and activities that make you want to smoke (triggers). Avoid them.  What first steps can I take to quit smoking?  · Throw away all cigarettes at home, at work, and in your car.  · Throw away the things that you use when you smoke, such as ashtrays and lighters.  · Clean your car. Make sure to empty the ashtray.  · Clean your home, including curtains and carpets.  What can I do to help me quit smoking?  Talk with your doctor about taking medicines and seeing a counselor at the same time. You are more likely to succeed when you do both.  · If you are pregnant or breastfeeding, talk with your doctor about counseling or other ways to quit smoking. Do not take medicine to help you quit smoking unless your doctor tells you to do so.  To quit smoking:  Quit right away  · Quit smoking totally, instead of slowly cutting back on how much you smoke over a period of time.  · Go to counseling. You are more likely to quit if you go to counseling sessions regularly.  Take medicine  You may take medicines to help you quit. Some  medicines need a prescription, and some you can buy over-the-counter. Some medicines may contain a drug called nicotine to replace the nicotine in cigarettes. Medicines may:  · Help you to stop having the desire to smoke (cravings).  · Help to stop the problems that come when you stop smoking (withdrawal symptoms).  Your doctor may ask you to use:  · Nicotine patches, gum, or lozenges.  · Nicotine inhalers or sprays.  · Non-nicotine medicine that is taken by mouth.  Find resources  Find resources and other ways to help you quit smoking and remain smoke-free after you quit. These resources are most helpful when you use them often. They include:  · Online chats with a counselor.  · Phone quitlines.  · Printed self-help materials.  · Support groups or group counseling.  · Text messaging programs.  · Mobile phone apps. Use apps on your mobile phone or tablet that can help you stick to your quit plan. There are many free apps for mobile phones and tablets as well as websites. Examples include Quit Guide from the CDC and smokefree.gov    What things can I do to make it easier to quit?    · Talk to your family and friends. Ask them to support and encourage you.  · Call a phone quitline (3-631-QUITNOW), reach out to support groups, or work with a counselor.  · Ask people who smoke to not smoke around you.  · Avoid places that make you want to smoke, such as:  ? Bars.  ? Parties.  ? Smoke-break areas at work.  · Spend time with people who do not smoke.  · Lower the stress in your life. Stress can make you want to smoke. Try these things to help your stress:  ? Getting regular exercise.  ? Doing deep-breathing exercises.  ? Doing yoga.  ? Meditating.  ? Doing a body scan. To do this, close your eyes, focus on one area of your body at a time from head to toe. Notice which parts of your body are tense. Try to relax the muscles in those areas.  How will I feel when I quit smoking?  Day 1 to 3 weeks  Within the first 24 hours,  you may start to have some problems that come from quitting tobacco. These problems are very bad 2-3 days after you quit, but they do not often last for more than 2-3 weeks. You may get these symptoms:  · Mood swings.  · Feeling restless, nervous, angry, or annoyed.  · Trouble concentrating.  · Dizziness.  · Strong desire for high-sugar foods and nicotine.  · Weight gain.  · Trouble pooping (constipation).  · Feeling like you may vomit (nausea).  · Coughing or a sore throat.  · Changes in how the medicines that you take for other issues work in your body.  · Depression.  · Trouble sleeping (insomnia).  Week 3 and afterward  After the first 2-3 weeks of quitting, you may start to notice more positive results, such as:  · Better sense of smell and taste.  · Less coughing and sore throat.  · Slower heart rate.  · Lower blood pressure.  · Clearer skin.  · Better breathing.  · Fewer sick days.  Quitting smoking can be hard. Do not give up if you fail the first time. Some people need to try a few times before they succeed. Do your best to stick to your quit plan, and talk with your doctor if you have any questions or concerns.  Summary  · Smoking tobacco is the leading cause of preventable death. Quitting smoking can be hard, but it is one of the best things that you can do for your health.  · When you decide to quit smoking, make a plan to help you succeed.  · Quit smoking right away, not slowly over a period of time.  · When you start quitting, seek help from your doctor, family, or friends.  This information is not intended to replace advice given to you by your health care provider. Make sure you discuss any questions you have with your health care provider.  Document Revised: 09/11/2020 Document Reviewed: 03/07/2020  "[employer] Patient Education © 2021 "[employer] Inc.  Heart-Healthy Eating Plan  Heart-healthy meal planning includes:  · Eating less unhealthy fats.  · Eating more healthy fats.  · Making other changes in  your diet.  Talk with your doctor or a diet specialist (dietitian) to create an eating plan that is right for you.  What is my plan?  Your doctor may recommend an eating plan that includes:  · Total fat: ______% or less of total calories a day.  · Saturated fat: ______% or less of total calories a day.  · Cholesterol: less than _________mg a day.  What are tips for following this plan?  Cooking  Avoid frying your food. Try to bake, boil, grill, or broil it instead. You can also reduce fat by:  · Removing the skin from poultry.  · Removing all visible fats from meats.  · Steaming vegetables in water or broth.  Meal planning    · At meals, divide your plate into four equal parts:  ? Fill one-half of your plate with vegetables and green salads.  ? Fill one-fourth of your plate with whole grains.  ? Fill one-fourth of your plate with lean protein foods.  · Eat 4-5 servings of vegetables per day. A serving of vegetables is:  ? 1 cup of raw or cooked vegetables.  ? 2 cups of raw leafy greens.  · Eat 4-5 servings of fruit per day. A serving of fruit is:  ? 1 medium whole fruit.  ? ¼ cup of dried fruit.  ? ½ cup of fresh, frozen, or canned fruit.  ? ½ cup of 100% fruit juice.  · Eat more foods that have soluble fiber. These are apples, broccoli, carrots, beans, peas, and barley. Try to get 20-30 g of fiber per day.  · Eat 4-5 servings of nuts, legumes, and seeds per week:  ? 1 serving of dried beans or legumes equals ½ cup after being cooked.  ? 1 serving of nuts is ¼ cup.  ? 1 serving of seeds equals 1 tablespoon.  General information  · Eat more home-cooked food. Eat less restaurant, buffet, and fast food.  · Limit or avoid alcohol.  · Limit foods that are high in starch and sugar.  · Avoid fried foods.  · Lose weight if you are overweight.  · Keep track of how much salt (sodium) you eat. This is important if you have high blood pressure. Ask your doctor to tell you more about this.  · Try to add vegetarian meals each  week.  Fats  · Choose healthy fats. These include olive oil and canola oil, flaxseeds, walnuts, almonds, and seeds.  · Eat more omega-3 fats. These include salmon, mackerel, sardines, tuna, flaxseed oil, and ground flaxseeds. Try to eat fish at least 2 times each week.  · Check food labels. Avoid foods with trans fats or high amounts of saturated fat.  · Limit saturated fats.  ? These are often found in animal products, such as meats, butter, and cream.  ? These are also found in plant foods, such as palm oil, palm kernel oil, and coconut oil.  · Avoid foods with partially hydrogenated oils in them. These have trans fats. Examples are stick margarine, some tub margarines, cookies, crackers, and other baked goods.  What foods can I eat?  Fruits  All fresh, canned (in natural juice), or frozen fruits.  Vegetables  Fresh or frozen vegetables (raw, steamed, roasted, or grilled). Green salads.  Grains  Most grains. Choose whole wheat and whole grains most of the time. Rice and pasta, including brown rice and pastas made with whole wheat.  Meats and other proteins  Lean, well-trimmed beef, veal, pork, and lamb. Chicken and turkey without skin. All fish and shellfish. Wild duck, rabbit, pheasant, and venison. Egg whites or low-cholesterol egg substitutes. Dried beans, peas, lentils, and tofu. Seeds and most nuts.  Dairy  Low-fat or nonfat cheeses, including ricotta and mozzarella. Skim or 1% milk that is liquid, powdered, or evaporated. Buttermilk that is made with low-fat milk. Nonfat or low-fat yogurt.  Fats and oils  Non-hydrogenated (trans-free) margarines. Vegetable oils, including soybean, sesame, sunflower, olive, peanut, safflower, corn, canola, and cottonseed. Salad dressings or mayonnaise made with a vegetable oil.  Beverages  Mineral water. Coffee and tea. Diet carbonated beverages.  Sweets and desserts  Sherbet, gelatin, and fruit ice. Small amounts of dark chocolate.  Limit all sweets and desserts.  Seasonings  and condiments  All seasonings and condiments.  The items listed above may not be a complete list of foods and drinks you can eat. Contact a dietitian for more options.  What foods should I avoid?  Fruits  Canned fruit in heavy syrup. Fruit in cream or butter sauce. Fried fruit. Limit coconut.  Vegetables  Vegetables cooked in cheese, cream, or butter sauce. Fried vegetables.  Grains  Breads that are made with saturated or trans fats, oils, or whole milk. Croissants. Sweet rolls. Donuts. High-fat crackers, such as cheese crackers.  Meats and other proteins  Fatty meats, such as hot dogs, ribs, sausage, jeffrey, rib-eye roast or steak. High-fat deli meats, such as salami and bologna. Caviar. Domestic duck and goose. Organ meats, such as liver.  Dairy  Cream, sour cream, cream cheese, and creamed cottage cheese. Whole-milk cheeses. Whole or 2% milk that is liquid, evaporated, or condensed. Whole buttermilk. Cream sauce or high-fat cheese sauce. Yogurt that is made from whole milk.  Fats and oils  Meat fat, or shortening. Cocoa butter, hydrogenated oils, palm oil, coconut oil, palm kernel oil. Solid fats and shortenings, including jeffrey fat, salt pork, lard, and butter. Nondairy cream substitutes. Salad dressings with cheese or sour cream.  Beverages  Regular sodas and juice drinks with added sugar.  Sweets and desserts  Frosting. Pudding. Cookies. Cakes. Pies. Milk chocolate or white chocolate. Buttered syrups. Full-fat ice cream or ice cream drinks.  The items listed above may not be a complete list of foods and drinks to avoid. Contact a dietitian for more information.  Summary  · Heart-healthy meal planning includes eating less unhealthy fats, eating more healthy fats, and making other changes in your diet.  · Eat a balanced diet. This includes fruits and vegetables, low-fat or nonfat dairy, lean protein, nuts and legumes, whole grains, and heart-healthy oils and fats.  This information is not intended to replace  advice given to you by your health care provider. Make sure you discuss any questions you have with your health care provider.  Document Revised: 02/21/2019 Document Reviewed: 01/25/2019  [employer] Patient Education © 2021 [employer]    Acute Coronary Syndrome  Acute coronary syndrome (ACS) is a serious problem in which there is suddenly not enough blood and oxygen reaching the heart. ACS can result in chest pain or a heart attack.  This condition is a medical emergency. If you have any symptoms of this condition, get help right away.  What are the causes?  This condition may be caused by:  · A buildup of fat and cholesterol inside the arteries (atherosclerosis). This is the most common cause. The buildup (plaque) can cause blood vessels in the heart (coronary arteries) to become narrow or blocked, which reduces blood flow to the heart. Plaque can also break off and lead to a clot, which can block an artery and cause a heart attack or stroke.  · Sudden tightening of the muscles around the coronary arteries (coronary spasm).  · Tearing of a coronary artery (spontaneous coronary artery dissection).  · Very low blood pressure (hypotension).  · An abnormal heartbeat (arrhythmia).  · Other medical conditions that cause a decrease of oxygen to the heart, such as anemiaorrespiratory failure.  · Using cocaine or methamphetamine.  What increases the risk?  The following factors may make you more likely to develop this condition:  · Age. The risk for ACS increases as you get older.  · History of chest pain, heart attack, peripheral artery disease, or stroke.  · Having taken chemotherapy or immune-suppressing medicines.  · Being male.  · Family history of chest pain, heart disease, or stroke.  · Smoking.  · Not exercising enough.  · Being overweight.  · High cholesterol.  · High blood pressure (hypertension).  · Diabetes.  · Excessive alcohol use.  What are the signs or symptoms?  Common symptoms of this condition  include:  · Chest pain. The pain may last a long time, or it may stop and come back (recur). It may feel like:  ? Crushing or squeezing.  ? Tightness, pressure, fullness, or heaviness.  · Arm, neck, jaw, or back pain.  · Heartburn or indigestion.  · Shortness of breath.  · Nausea.  · Sudden cold sweats.  · Light-headedness.  · Dizziness or passing out.  · Tiredness (fatigue).  Sometimes there are no symptoms.  How is this diagnosed?  This condition may be diagnosed based on:  · Your medical history and symptoms.  · Imaging tests, such as:  ? An electrocardiogram (ECG). This measures the heart's electrical activity.  ? X-rays.  ? CT scan.  ? A coronary angiogram. For this test, dye is injected into the heart arteries and then X-rays are taken.  ? Myocardial perfusion imaging. This test shows how well blood flows through your heart muscle.  · Blood tests. These may be repeated at certain time intervals.  · Exercise stress testing.  · Echocardiogram. This is a test that uses sound waves to produce detailed images of the heart.  How is this treated?  Treatment for this condition may include:  · Oxygen therapy.  · Medicines, such as:  ? Antiplatelet medicines and blood-thinning medicines, such as aspirin. These help prevent blood clots.  ? Medicine that dissolves any blood clots (fibrinolytic therapy).  ? Blood pressure medicines.  ? Nitroglycerin. This helps widen blood vessels to improve blood flow.  ? Pain medicine.  ? Cholesterol-lowering medicine.  · Surgery, such as:  ? Coronary angioplasty with stent placement. This involves placing a small piece of metal that looks like mesh or a spring into a narrow coronary artery. This widens the artery and keeps it open.  ? Coronary artery bypass surgery. This involves taking a section of a blood vessel from a different part of your body and placing it on the blocked coronary artery to allow blood to flow around the blockage.  · Cardiac rehabilitation. This is a program that  includes exercise training, education, and counseling to help you recover.  Follow these instructions at home:  Eating and drinking  · Eat a heart-healthy diet that includes whole grains, fruits and vegetables, lean proteins, and low-fat or nonfat dairy products.  · Limit how much salt (sodium) you eat as told by your health care provider. Follow instructions from your health care provider about any other eating or drinking restrictions, such as limiting foods that are high in fat and processed sugars.  · Use healthy cooking methods such as roasting, grilling, broiling, baking, poaching, steaming, or stir-frying.  · Work with a dietitian to follow a heart-healthy eating plan.  Medicines  · Take over-the-counter and prescription medicines only as told by your health care provider.  · Do not take these medicines unless your health care provider approves:  ? Vitamin supplements that contain vitamin A or vitamin E.  ? NSAIDs, such as ibuprofen, naproxen, or celecoxib.  ? Hormone replacement therapy that contains estrogen.  · If you are taking blood thinners:  ? Talk with your health care provider before you take any medicines that contain aspirin or NSAIDs. These medicines increase your risk for dangerous bleeding.  ? Take your medicine exactly as told, at the same time every day.  ? Avoid activities that could cause injury or bruising, and follow instructions about how to prevent falls.  ? Wear a medical alert bracelet, and carry a card that lists what medicines you take.  Activity  · Follow your cardiac rehabilitation program. Do exercises as told by your physical therapist.  · Ask your health care provider what activities and exercises are safe for you. Follow his or her instructions about lifting, driving, or climbing stairs.  Lifestyle  · Do not use any products that contain nicotine or tobacco, such as cigarettes, e-cigarettes, and chewing tobacco. If you need help quitting, ask your health care provider.  · Do  not drink alcohol if your health care provider tells you not to drink.  · If you drink alcohol:  ? Limit how much you have to 0-1 drink a day.  ? Be aware of how much alcohol is in your drink. In the [place] one drink equals one 12 oz bottle of beer (355 mL), one 5 oz glass of wine (148 mL), or one 1½ oz glass of hard liquor (44 mL).  · Maintain a healthy weight. If you need to lose weight, work with your health care provider to do so safely.  General instructions  · Tell all the health care providers who provide care for you about your heart condition, including your dentist. This may affect the medicines or treatment you receive.  · Manage any other health conditions you have, such as hypertension or diabetes. These conditions affect your heart.  · Pay attention to your mental health. You may be at higher risk for depression.  ? Find ways to manage stress.  ? Talk to your health care provider about depression screening and treatment.  · Keep your vaccinations up to date.  ? Get the flu shot (influenza vaccine) every year.  ? Get the pneumococcal vaccine if you are age 65 or older.  · If directed, monitor your blood pressure at home.  · Keep all follow-up visits as told by your health care provider. This is important.  Contact a health care provider if you:  · Feel overwhelmed or sad.  · Have trouble doing your daily activities.  Get help right away if you:  · Have pain in your chest, neck, arm, jaw, stomach, or back that recurs, and:  ? It lasts for more than a few minutes.  ? It is not relieved by taking the medicineyour health care provider prescribed.  · Have unexplained:  ? Heavy sweating.  ? Heartburn or indigestion.  ? Nausea or vomiting.  ? Shortness of breath.  ? Difficulty breathing.  ? Fatigue.  ? Nervousness or anxiety.  ? Weakness.  ? Diarrhea.  ? Dark stools or blood in your stool.  · Have sudden light-headedness or dizziness.  · Have blood pressure that is higher than 180/120.  · Faint.  · Have  thoughts about hurting yourself.  These symptoms may represent a serious problem that is an emergency. Do not wait to see if the symptoms will go away. Get medical help right away. Call your local emergency services (911 in the [place] Do not drive yourself to the hospital.   Summary  · Acute coronary syndrome (ACS) is when there is not enough blood and oxygen being supplied to the heart. ACS can result in chest pain or a heart attack.  · Acute coronary syndrome is a medical emergency. If you have any symptoms of this condition, get help right away.  · Treatment includes medicines and procedures to open the blocked arteries and restore blood flow.  This information is not intended to replace advice given to you by your health care provider. Make sure you discuss any questions you have with your health care provider.  Document Revised: 05/20/2020 Document Reviewed: 12/30/2019  [employer] Patient Education © 2021 [employer]      Palpitations  Palpitations are feelings that your heartbeat is not normal. Your heartbeat may feel like it is:  · Uneven.  · Faster than normal.  · Fluttering.  · Skipping a beat.  This is usually not a serious problem. In some cases, you may need tests to rule out any serious problems.  Follow these instructions at home:  Pay attention to any changes in your condition. Take these actions to help manage your symptoms:  Eating and drinking  · Avoid:  ? Coffee, tea, soft drinks, and energy drinks.  ? Chocolate.  ? Alcohol.  ? Diet pills.  Lifestyle    · Try to lower your stress. These things can help you relax:  ? Yoga.  ? Deep breathing and meditation.  ? Exercise.  ? Using words and images to create positive thoughts (guided imagery).  ? Using your mind to control things in your body (biofeedback).  · Do not use drugs.  · Get plenty of rest and sleep. Keep a regular bed time.  General instructions    · Take over-the-counter and prescription medicines only as told by your doctor.  · Do not use  any products that contain nicotine or tobacco, such as cigarettes and e-cigarettes. If you need help quitting, ask your doctor.  · Keep all follow-up visits as told by your doctor. This is important. You may need more tests if palpitations do not go away or get worse.  Contact a doctor if:  · Your symptoms last more than 24 hours.  · Your symptoms occur more often.  Get help right away if you:  · Have chest pain.  · Feel short of breath.  · Have a very bad headache.  · Feel dizzy.  · Pass out (faint).  Summary  · Palpitations are feelings that your heartbeat is uneven or faster than normal. It may feel like your heart is fluttering or skipping a beat.  · Avoid food and drinks that may cause palpitations. These include caffeine, chocolate, and alcohol.  · Try to lower your stress. Do not smoke or use drugs.  · Get help right away if you faint or have chest pain, shortness of breath, a severe headache, or dizziness.  This information is not intended to replace advice given to you by your health care provider. Make sure you discuss any questions you have with your health care provider.  Document Revised: 01/30/2019 Document Reviewed: 01/30/2019  [employer] Patient Education © 2021 [employer]

## 2021-05-20 ENCOUNTER — TELEPHONE (OUTPATIENT)
Dept: CARDIOLOGY | Facility: CLINIC | Age: 51
End: 2021-05-20

## 2021-05-20 NOTE — TELEPHONE ENCOUNTER
Patient jerilyn disability paperwork into office. Spoke to patient and let her know we don't fill out disability form.

## 2021-06-08 ENCOUNTER — TELEPHONE (OUTPATIENT)
Dept: CARDIOLOGY | Facility: CLINIC | Age: 51
End: 2021-06-08

## 2021-06-08 NOTE — TELEPHONE ENCOUNTER
Patient informed of stress test results. She will keep follow up as scheduled. Yady Motta MA      ----- Message from ZOHREH Moralez sent at 6/8/2021 12:41 PM EDT -----  Negative stress test.  Keep follow-up.  ----- Message -----  From: Madeleine Hughes RegSched Rep  Sent: 6/7/2021   4:00 PM EDT  To: ZOHREH Moralez

## 2021-08-25 ENCOUNTER — OFFICE VISIT (OUTPATIENT)
Dept: CARDIOLOGY | Facility: CLINIC | Age: 51
End: 2021-08-25

## 2021-08-25 VITALS
WEIGHT: 236 LBS | OXYGEN SATURATION: 95 % | HEIGHT: 66 IN | DIASTOLIC BLOOD PRESSURE: 80 MMHG | SYSTOLIC BLOOD PRESSURE: 140 MMHG | BODY MASS INDEX: 37.93 KG/M2 | HEART RATE: 68 BPM

## 2021-08-25 DIAGNOSIS — R00.2 PALPITATIONS: ICD-10-CM

## 2021-08-25 DIAGNOSIS — I10 ESSENTIAL HYPERTENSION: ICD-10-CM

## 2021-08-25 DIAGNOSIS — R07.89 ATYPICAL CHEST PAIN: Primary | ICD-10-CM

## 2021-08-25 DIAGNOSIS — R01.1 HEART MURMUR: ICD-10-CM

## 2021-08-25 PROCEDURE — 99213 OFFICE O/P EST LOW 20 MIN: CPT | Performed by: NURSE PRACTITIONER

## 2021-08-25 NOTE — PATIENT INSTRUCTIONS
Acute Coronary Syndrome  Acute coronary syndrome (ACS) is a serious problem in which there is suddenly not enough blood and oxygen reaching the heart. ACS can result in chest pain or a heart attack.  This condition is a medical emergency. If you have any symptoms of this condition, get help right away.  What are the causes?  This condition may be caused by:  · A buildup of fat and cholesterol inside the arteries (atherosclerosis). This is the most common cause. The buildup (plaque) can cause blood vessels in the heart (coronary arteries) to become narrow or blocked, which reduces blood flow to the heart. Plaque can also break off and lead to a clot, which can block an artery and cause a heart attack or stroke.  · Sudden tightening of the muscles around the coronary arteries (coronary spasm).  · Tearing of a coronary artery (spontaneous coronary artery dissection).  · Very low blood pressure (hypotension).  · An abnormal heartbeat (arrhythmia).  · Other medical conditions that cause a decrease of oxygen to the heart, such as anemiaorrespiratory failure.  · Using cocaine or methamphetamine.  What increases the risk?  The following factors may make you more likely to develop this condition:  · Age. The risk for ACS increases as you get older.  · History of chest pain, heart attack, peripheral artery disease, or stroke.  · Having taken chemotherapy or immune-suppressing medicines.  · Being male.  · Family history of chest pain, heart disease, or stroke.  · Smoking.  · Not exercising enough.  · Being overweight.  · High cholesterol.  · High blood pressure (hypertension).  · Diabetes.  · Excessive alcohol use.  What are the signs or symptoms?  Common symptoms of this condition include:  · Chest pain. The pain may last a long time, or it may stop and come back (recur). It may feel like:  ? Crushing or squeezing.  ? Tightness, pressure, fullness, or heaviness.  · Arm, neck, jaw, or back pain.  · Heartburn or  indigestion.  · Shortness of breath.  · Nausea.  · Sudden cold sweats.  · Light-headedness.  · Dizziness or passing out.  · Tiredness (fatigue).  Sometimes there are no symptoms.  How is this diagnosed?  This condition may be diagnosed based on:  · Your medical history and symptoms.  · Imaging tests, such as:  ? An electrocardiogram (ECG). This measures the heart's electrical activity.  ? X-rays.  ? CT scan.  ? A coronary angiogram. For this test, dye is injected into the heart arteries and then X-rays are taken.  ? Myocardial perfusion imaging. This test shows how well blood flows through your heart muscle.  · Blood tests. These may be repeated at certain time intervals.  · Exercise stress testing.  · Echocardiogram. This is a test that uses sound waves to produce detailed images of the heart.  How is this treated?  Treatment for this condition may include:  · Oxygen therapy.  · Medicines, such as:  ? Antiplatelet medicines and blood-thinning medicines, such as aspirin. These help prevent blood clots.  ? Medicine that dissolves any blood clots (fibrinolytic therapy).  ? Blood pressure medicines.  ? Nitroglycerin. This helps widen blood vessels to improve blood flow.  ? Pain medicine.  ? Cholesterol-lowering medicine.  · Surgery, such as:  ? Coronary angioplasty with stent placement. This involves placing a small piece of metal that looks like mesh or a spring into a narrow coronary artery. This widens the artery and keeps it open.  ? Coronary artery bypass surgery. This involves taking a section of a blood vessel from a different part of your body and placing it on the blocked coronary artery to allow blood to flow around the blockage.  · Cardiac rehabilitation. This is a program that includes exercise training, education, and counseling to help you recover.  Follow these instructions at home:  Eating and drinking  · Eat a heart-healthy diet that includes whole grains, fruits and vegetables, lean proteins, and  low-fat or nonfat dairy products.  · Limit how much salt (sodium) you eat as told by your health care provider. Follow instructions from your health care provider about any other eating or drinking restrictions, such as limiting foods that are high in fat and processed sugars.  · Use healthy cooking methods such as roasting, grilling, broiling, baking, poaching, steaming, or stir-frying.  · Work with a dietitian to follow a heart-healthy eating plan.  Medicines  · Take over-the-counter and prescription medicines only as told by your health care provider.  · Do not take these medicines unless your health care provider approves:  ? Vitamin supplements that contain vitamin A or vitamin E.  ? NSAIDs, such as ibuprofen, naproxen, or celecoxib.  ? Hormone replacement therapy that contains estrogen.  · If you are taking blood thinners:  ? Talk with your health care provider before you take any medicines that contain aspirin or NSAIDs. These medicines increase your risk for dangerous bleeding.  ? Take your medicine exactly as told, at the same time every day.  ? Avoid activities that could cause injury or bruising, and follow instructions about how to prevent falls.  ? Wear a medical alert bracelet, and carry a card that lists what medicines you take.  Activity  · Follow your cardiac rehabilitation program. Do exercises as told by your physical therapist.  · Ask your health care provider what activities and exercises are safe for you. Follow his or her instructions about lifting, driving, or climbing stairs.  Lifestyle  · Do not use any products that contain nicotine or tobacco, such as cigarettes, e-cigarettes, and chewing tobacco. If you need help quitting, ask your health care provider.  · Do not drink alcohol if your health care provider tells you not to drink.  · If you drink alcohol:  ? Limit how much you have to 0-1 drink a day.  ? Be aware of how much alcohol is in your drink. In the [place] one drink equals one 12 oz  bottle of beer (355 mL), one 5 oz glass of wine (148 mL), or one 1½ oz glass of hard liquor (44 mL).  · Maintain a healthy weight. If you need to lose weight, work with your health care provider to do so safely.  General instructions  · Tell all the health care providers who provide care for you about your heart condition, including your dentist. This may affect the medicines or treatment you receive.  · Manage any other health conditions you have, such as hypertension or diabetes. These conditions affect your heart.  · Pay attention to your mental health. You may be at higher risk for depression.  ? Find ways to manage stress.  ? Talk to your health care provider about depression screening and treatment.  · Keep your vaccinations up to date.  ? Get the flu shot (influenza vaccine) every year.  ? Get the pneumococcal vaccine if you are age 65 or older.  · If directed, monitor your blood pressure at home.  · Keep all follow-up visits as told by your health care provider. This is important.  Contact a health care provider if you:  · Feel overwhelmed or sad.  · Have trouble doing your daily activities.  Get help right away if you:  · Have pain in your chest, neck, arm, jaw, stomach, or back that recurs, and:  ? It lasts for more than a few minutes.  ? It is not relieved by taking the medicineyour health care provider prescribed.  · Have unexplained:  ? Heavy sweating.  ? Heartburn or indigestion.  ? Nausea or vomiting.  ? Shortness of breath.  ? Difficulty breathing.  ? Fatigue.  ? Nervousness or anxiety.  ? Weakness.  ? Diarrhea.  ? Dark stools or blood in your stool.  · Have sudden light-headedness or dizziness.  · Have blood pressure that is higher than 180/120.  · Faint.  · Have thoughts about hurting yourself.  These symptoms may represent a serious problem that is an emergency. Do not wait to see if the symptoms will go away. Get medical help right away. Call your local emergency services (911 in the [place] Do  not drive yourself to the hospital.   Summary  · Acute coronary syndrome (ACS) is when there is not enough blood and oxygen being supplied to the heart. ACS can result in chest pain or a heart attack.  · Acute coronary syndrome is a medical emergency. If you have any symptoms of this condition, get help right away.  · Treatment includes medicines and procedures to open the blocked arteries and restore blood flow.  This information is not intended to replace advice given to you by your health care provider. Make sure you discuss any questions you have with your health care provider.  Document Revised: 05/20/2020 Document Reviewed: 12/30/2019  [employer] Patient Education © 2021 [employer]      Hypertension, Adult  Hypertension is another name for high blood pressure. High blood pressure forces your heart to work harder to pump blood. This can cause problems over time.  There are two numbers in a blood pressure reading. There is a top number (systolic) over a bottom number (diastolic). It is best to have a blood pressure that is below 120/80. Healthy choices can help lower your blood pressure, or you may need medicine to help lower it.  What are the causes?  The cause of this condition is not known. Some conditions may be related to high blood pressure.  What increases the risk?  · Smoking.  · Having type 2 diabetes mellitus, high cholesterol, or both.  · Not getting enough exercise or physical activity.  · Being overweight.  · Having too much fat, sugar, calories, or salt (sodium) in your diet.  · Drinking too much alcohol.  · Having long-term (chronic) kidney disease.  · Having a family history of high blood pressure.  · Age. Risk increases with age.  · Race. You may be at higher risk if you are .  · Gender. Men are at higher risk than women before age 45. After age 65, women are at higher risk than men.  · Having obstructive sleep apnea.  · Stress.  What are the signs or symptoms?  · High blood  pressure may not cause symptoms. Very high blood pressure (hypertensive crisis) may cause:  ? Headache.  ? Feelings of worry or nervousness (anxiety).  ? Shortness of breath.  ? Nosebleed.  ? A feeling of being sick to your stomach (nausea).  ? Throwing up (vomiting).  ? Changes in how you see.  ? Very bad chest pain.  ? Seizures.  How is this treated?  · This condition is treated by making healthy lifestyle changes, such as:  ? Eating healthy foods.  ? Exercising more.  ? Drinking less alcohol.  · Your health care provider may prescribe medicine if lifestyle changes are not enough to get your blood pressure under control, and if:  ? Your top number is above 130.  ? Your bottom number is above 80.  · Your personal target blood pressure may vary.  Follow these instructions at home:  Eating and drinking    · If told, follow the DASH eating plan. To follow this plan:  ? Fill one half of your plate at each meal with fruits and vegetables.  ? Fill one fourth of your plate at each meal with whole grains. Whole grains include whole-wheat pasta, brown rice, and whole-grain bread.  ? Eat or drink low-fat dairy products, such as skim milk or low-fat yogurt.  ? Fill one fourth of your plate at each meal with low-fat (lean) proteins. Low-fat proteins include fish, chicken without skin, eggs, beans, and tofu.  ? Avoid fatty meat, cured and processed meat, or chicken with skin.  ? Avoid pre-made or processed food.  · Eat less than 1,500 mg of salt each day.  · Do not drink alcohol if:  ? Your doctor tells you not to drink.  ? You are pregnant, may be pregnant, or are planning to become pregnant.  · If you drink alcohol:  ? Limit how much you use to:  § 0-1 drink a day for women.  § 0-2 drinks a day for men.  ? Be aware of how much alcohol is in your drink. In the [place] one drink equals one 12 oz bottle of beer (355 mL), one 5 oz glass of wine (148 mL), or one 1½ oz glass of hard liquor (44 mL).  Lifestyle    · Work with your  doctor to stay at a healthy weight or to lose weight. Ask your doctor what the best weight is for you.  · Get at least 30 minutes of exercise most days of the week. This may include walking, swimming, or biking.  · Get at least 30 minutes of exercise that strengthens your muscles (resistance exercise) at least 3 days a week. This may include lifting weights or doing Pilates.  · Do not use any products that contain nicotine or tobacco, such as cigarettes, e-cigarettes, and chewing tobacco. If you need help quitting, ask your doctor.  · Check your blood pressure at home as told by your doctor.  · Keep all follow-up visits as told by your doctor. This is important.  Medicines  · Take over-the-counter and prescription medicines only as told by your doctor. Follow directions carefully.  · Do not skip doses of blood pressure medicine. The medicine does not work as well if you skip doses. Skipping doses also puts you at risk for problems.  · Ask your doctor about side effects or reactions to medicines that you should watch for.  Contact a doctor if you:  · Think you are having a reaction to the medicine you are taking.  · Have headaches that keep coming back (recurring).  · Feel dizzy.  · Have swelling in your ankles.  · Have trouble with your vision.  Get help right away if you:  · Get a very bad headache.  · Start to feel mixed up (confused).  · Feel weak or numb.  · Feel faint.  · Have very bad pain in your:  ? Chest.  ? Belly (abdomen).  · Throw up more than once.  · Have trouble breathing.  Summary  · Hypertension is another name for high blood pressure.  · High blood pressure forces your heart to work harder to pump blood.  · For most people, a normal blood pressure is less than 120/80.  · Making healthy choices can help lower blood pressure. If your blood pressure does not get lower with healthy choices, you may need to take medicine.  This information is not intended to replace advice given to you by your health  care provider. Make sure you discuss any questions you have with your health care provider.  Document Revised: 08/28/2019 Document Reviewed: 08/28/2019  "[employer]" Patient Education © 2021 "[employer] Inc[employer]      Acute Coronary Syndrome  Acute coronary syndrome (ACS) is a serious problem in which there is suddenly not enough blood and oxygen reaching the heart. ACS can result in chest pain or a heart attack.  This condition is a medical emergency. If you have any symptoms of this condition, get help right away.  What are the causes?  This condition may be caused by:  · A buildup of fat and cholesterol inside the arteries (atherosclerosis). This is the most common cause. The buildup (plaque) can cause blood vessels in the heart (coronary arteries) to become narrow or blocked, which reduces blood flow to the heart. Plaque can also break off and lead to a clot, which can block an artery and cause a heart attack or stroke.  · Sudden tightening of the muscles around the coronary arteries (coronary spasm).  · Tearing of a coronary artery (spontaneous coronary artery dissection).  · Very low blood pressure (hypotension).  · An abnormal heartbeat (arrhythmia).  · Other medical conditions that cause a decrease of oxygen to the heart, such as anemiaorrespiratory failure.  · Using cocaine or methamphetamine.  What increases the risk?  The following factors may make you more likely to develop this condition:  · Age. The risk for ACS increases as you get older.  · History of chest pain, heart attack, peripheral artery disease, or stroke.  · Having taken chemotherapy or immune-suppressing medicines.  · Being male.  · Family history of chest pain, heart disease, or stroke.  · Smoking.  · Not exercising enough.  · Being overweight.  · High cholesterol.  · High blood pressure (hypertension).  · Diabetes.  · Excessive alcohol use.  What are the signs or symptoms?  Common symptoms of this condition include:  · Chest pain. The pain may last a  long time, or it may stop and come back (recur). It may feel like:  ? Crushing or squeezing.  ? Tightness, pressure, fullness, or heaviness.  · Arm, neck, jaw, or back pain.  · Heartburn or indigestion.  · Shortness of breath.  · Nausea.  · Sudden cold sweats.  · Light-headedness.  · Dizziness or passing out.  · Tiredness (fatigue).  Sometimes there are no symptoms.  How is this diagnosed?  This condition may be diagnosed based on:  · Your medical history and symptoms.  · Imaging tests, such as:  ? An electrocardiogram (ECG). This measures the heart's electrical activity.  ? X-rays.  ? CT scan.  ? A coronary angiogram. For this test, dye is injected into the heart arteries and then X-rays are taken.  ? Myocardial perfusion imaging. This test shows how well blood flows through your heart muscle.  · Blood tests. These may be repeated at certain time intervals.  · Exercise stress testing.  · Echocardiogram. This is a test that uses sound waves to produce detailed images of the heart.  How is this treated?  Treatment for this condition may include:  · Oxygen therapy.  · Medicines, such as:  ? Antiplatelet medicines and blood-thinning medicines, such as aspirin. These help prevent blood clots.  ? Medicine that dissolves any blood clots (fibrinolytic therapy).  ? Blood pressure medicines.  ? Nitroglycerin. This helps widen blood vessels to improve blood flow.  ? Pain medicine.  ? Cholesterol-lowering medicine.  · Surgery, such as:  ? Coronary angioplasty with stent placement. This involves placing a small piece of metal that looks like mesh or a spring into a narrow coronary artery. This widens the artery and keeps it open.  ? Coronary artery bypass surgery. This involves taking a section of a blood vessel from a different part of your body and placing it on the blocked coronary artery to allow blood to flow around the blockage.  · Cardiac rehabilitation. This is a program that includes exercise training, education, and  counseling to help you recover.  Follow these instructions at home:  Eating and drinking  · Eat a heart-healthy diet that includes whole grains, fruits and vegetables, lean proteins, and low-fat or nonfat dairy products.  · Limit how much salt (sodium) you eat as told by your health care provider. Follow instructions from your health care provider about any other eating or drinking restrictions, such as limiting foods that are high in fat and processed sugars.  · Use healthy cooking methods such as roasting, grilling, broiling, baking, poaching, steaming, or stir-frying.  · Work with a dietitian to follow a heart-healthy eating plan.  Medicines  · Take over-the-counter and prescription medicines only as told by your health care provider.  · Do not take these medicines unless your health care provider approves:  ? Vitamin supplements that contain vitamin A or vitamin E.  ? NSAIDs, such as ibuprofen, naproxen, or celecoxib.  ? Hormone replacement therapy that contains estrogen.  · If you are taking blood thinners:  ? Talk with your health care provider before you take any medicines that contain aspirin or NSAIDs. These medicines increase your risk for dangerous bleeding.  ? Take your medicine exactly as told, at the same time every day.  ? Avoid activities that could cause injury or bruising, and follow instructions about how to prevent falls.  ? Wear a medical alert bracelet, and carry a card that lists what medicines you take.  Activity  · Follow your cardiac rehabilitation program. Do exercises as told by your physical therapist.  · Ask your health care provider what activities and exercises are safe for you. Follow his or her instructions about lifting, driving, or climbing stairs.  Lifestyle  · Do not use any products that contain nicotine or tobacco, such as cigarettes, e-cigarettes, and chewing tobacco. If you need help quitting, ask your health care provider.  · Do not drink alcohol if your health care  provider tells you not to drink.  · If you drink alcohol:  ? Limit how much you have to 0-1 drink a day.  ? Be aware of how much alcohol is in your drink. In the [place] one drink equals one 12 oz bottle of beer (355 mL), one 5 oz glass of wine (148 mL), or one 1½ oz glass of hard liquor (44 mL).  · Maintain a healthy weight. If you need to lose weight, work with your health care provider to do so safely.  General instructions  · Tell all the health care providers who provide care for you about your heart condition, including your dentist. This may affect the medicines or treatment you receive.  · Manage any other health conditions you have, such as hypertension or diabetes. These conditions affect your heart.  · Pay attention to your mental health. You may be at higher risk for depression.  ? Find ways to manage stress.  ? Talk to your health care provider about depression screening and treatment.  · Keep your vaccinations up to date.  ? Get the flu shot (influenza vaccine) every year.  ? Get the pneumococcal vaccine if you are age 65 or older.  · If directed, monitor your blood pressure at home.  · Keep all follow-up visits as told by your health care provider. This is important.  Contact a health care provider if you:  · Feel overwhelmed or sad.  · Have trouble doing your daily activities.  Get help right away if you:  · Have pain in your chest, neck, arm, jaw, stomach, or back that recurs, and:  ? It lasts for more than a few minutes.  ? It is not relieved by taking the medicineyour health care provider prescribed.  · Have unexplained:  ? Heavy sweating.  ? Heartburn or indigestion.  ? Nausea or vomiting.  ? Shortness of breath.  ? Difficulty breathing.  ? Fatigue.  ? Nervousness or anxiety.  ? Weakness.  ? Diarrhea.  ? Dark stools or blood in your stool.  · Have sudden light-headedness or dizziness.  · Have blood pressure that is higher than 180/120.  · Faint.  · Have thoughts about hurting yourself.  These  symptoms may represent a serious problem that is an emergency. Do not wait to see if the symptoms will go away. Get medical help right away. Call your local emergency services (911 in the [place] Do not drive yourself to the hospital.   Summary  · Acute coronary syndrome (ACS) is when there is not enough blood and oxygen being supplied to the heart. ACS can result in chest pain or a heart attack.  · Acute coronary syndrome is a medical emergency. If you have any symptoms of this condition, get help right away.  · Treatment includes medicines and procedures to open the blocked arteries and restore blood flow.  This information is not intended to replace advice given to you by your health care provider. Make sure you discuss any questions you have with your health care provider.  Document Revised: 05/20/2020 Document Reviewed: 12/30/2019  [email or web address] Patient Education © 2021 [email or web address]      Hypertension, Adult  Hypertension is another name for high blood pressure. High blood pressure forces your heart to work harder to pump blood. This can cause problems over time.  There are two numbers in a blood pressure reading. There is a top number (systolic) over a bottom number (diastolic). It is best to have a blood pressure that is below 120/80. Healthy choices can help lower your blood pressure, or you may need medicine to help lower it.  What are the causes?  The cause of this condition is not known. Some conditions may be related to high blood pressure.  What increases the risk?  · Smoking.  · Having type 2 diabetes mellitus, high cholesterol, or both.  · Not getting enough exercise or physical activity.  · Being overweight.  · Having too much fat, sugar, calories, or salt (sodium) in your diet.  · Drinking too much alcohol.  · Having long-term (chronic) kidney disease.  · Having a family history of high blood pressure.  · Age. Risk increases with age.  · Race. You may be at higher risk if you are   American.  · Gender. Men are at higher risk than women before age 45. After age 65, women are at higher risk than men.  · Having obstructive sleep apnea.  · Stress.  What are the signs or symptoms?  · High blood pressure may not cause symptoms. Very high blood pressure (hypertensive crisis) may cause:  ? Headache.  ? Feelings of worry or nervousness (anxiety).  ? Shortness of breath.  ? Nosebleed.  ? A feeling of being sick to your stomach (nausea).  ? Throwing up (vomiting).  ? Changes in how you see.  ? Very bad chest pain.  ? Seizures.  How is this treated?  · This condition is treated by making healthy lifestyle changes, such as:  ? Eating healthy foods.  ? Exercising more.  ? Drinking less alcohol.  · Your health care provider may prescribe medicine if lifestyle changes are not enough to get your blood pressure under control, and if:  ? Your top number is above 130.  ? Your bottom number is above 80.  · Your personal target blood pressure may vary.  Follow these instructions at home:  Eating and drinking    · If told, follow the DASH eating plan. To follow this plan:  ? Fill one half of your plate at each meal with fruits and vegetables.  ? Fill one fourth of your plate at each meal with whole grains. Whole grains include whole-wheat pasta, brown rice, and whole-grain bread.  ? Eat or drink low-fat dairy products, such as skim milk or low-fat yogurt.  ? Fill one fourth of your plate at each meal with low-fat (lean) proteins. Low-fat proteins include fish, chicken without skin, eggs, beans, and tofu.  ? Avoid fatty meat, cured and processed meat, or chicken with skin.  ? Avoid pre-made or processed food.  · Eat less than 1,500 mg of salt each day.  · Do not drink alcohol if:  ? Your doctor tells you not to drink.  ? You are pregnant, may be pregnant, or are planning to become pregnant.  · If you drink alcohol:  ? Limit how much you use to:  § 0-1 drink a day for women.  § 0-2 drinks a day for men.  ? Be aware of  how much alcohol is in your drink. In the [place] one drink equals one 12 oz bottle of beer (355 mL), one 5 oz glass of wine (148 mL), or one 1½ oz glass of hard liquor (44 mL).  Lifestyle    · Work with your doctor to stay at a healthy weight or to lose weight. Ask your doctor what the best weight is for you.  · Get at least 30 minutes of exercise most days of the week. This may include walking, swimming, or biking.  · Get at least 30 minutes of exercise that strengthens your muscles (resistance exercise) at least 3 days a week. This may include lifting weights or doing Pilates.  · Do not use any products that contain nicotine or tobacco, such as cigarettes, e-cigarettes, and chewing tobacco. If you need help quitting, ask your doctor.  · Check your blood pressure at home as told by your doctor.  · Keep all follow-up visits as told by your doctor. This is important.  Medicines  · Take over-the-counter and prescription medicines only as told by your doctor. Follow directions carefully.  · Do not skip doses of blood pressure medicine. The medicine does not work as well if you skip doses. Skipping doses also puts you at risk for problems.  · Ask your doctor about side effects or reactions to medicines that you should watch for.  Contact a doctor if you:  · Think you are having a reaction to the medicine you are taking.  · Have headaches that keep coming back (recurring).  · Feel dizzy.  · Have swelling in your ankles.  · Have trouble with your vision.  Get help right away if you:  · Get a very bad headache.  · Start to feel mixed up (confused).  · Feel weak or numb.  · Feel faint.  · Have very bad pain in your:  ? Chest.  ? Belly (abdomen).  · Throw up more than once.  · Have trouble breathing.  Summary  · Hypertension is another name for high blood pressure.  · High blood pressure forces your heart to work harder to pump blood.  · For most people, a normal blood pressure is less than 120/80.  · Making healthy choices  can help lower blood pressure. If your blood pressure does not get lower with healthy choices, you may need to take medicine.  This information is not intended to replace advice given to you by your health care provider. Make sure you discuss any questions you have with your health care provider.  Document Revised: 08/28/2019 Document Reviewed: 08/28/2019  [employer] Patient Education © 2021 [employer]      Palpitations  Palpitations are feelings that your heartbeat is not normal. Your heartbeat may feel like it is:  · Uneven.  · Faster than normal.  · Fluttering.  · Skipping a beat.  This is usually not a serious problem. In some cases, you may need tests to rule out any serious problems.  Follow these instructions at home:  Pay attention to any changes in your condition. Take these actions to help manage your symptoms:  Eating and drinking  · Avoid:  ? Coffee, tea, soft drinks, and energy drinks.  ? Chocolate.  ? Alcohol.  ? Diet pills.  Lifestyle    · Try to lower your stress. These things can help you relax:  ? Yoga.  ? Deep breathing and meditation.  ? Exercise.  ? Using words and images to create positive thoughts (guided imagery).  ? Using your mind to control things in your body (biofeedback).  · Do not use drugs.  · Get plenty of rest and sleep. Keep a regular bed time.  General instructions    · Take over-the-counter and prescription medicines only as told by your doctor.  · Do not use any products that contain nicotine or tobacco, such as cigarettes and e-cigarettes. If you need help quitting, ask your doctor.  · Keep all follow-up visits as told by your doctor. This is important. You may need more tests if palpitations do not go away or get worse.  Contact a doctor if:  · Your symptoms last more than 24 hours.  · Your symptoms occur more often.  Get help right away if you:  · Have chest pain.  · Feel short of breath.  · Have a very bad headache.  · Feel dizzy.  · Pass out  (faint).  Summary  · Palpitations are feelings that your heartbeat is uneven or faster than normal. It may feel like your heart is fluttering or skipping a beat.  · Avoid food and drinks that may cause palpitations. These include caffeine, chocolate, and alcohol.  · Try to lower your stress. Do not smoke or use drugs.  · Get help right away if you faint or have chest pain, shortness of breath, a severe headache, or dizziness.  This information is not intended to replace advice given to you by your health care provider. Make sure you discuss any questions you have with your health care provider.  Document Revised: 01/30/2019 Document Reviewed: 01/30/2019  [employer] Patient Education © 2021 [employer]

## 2021-08-25 NOTE — PROGRESS NOTES
Subjective     Kirstie Jackson is a 50 y.o. female who presents to day for Chest Pain (presents for stress f/u) and Palpitations.    CHIEF COMPLIANT  Chief Complaint   Patient presents with   • Chest Pain     presents for stress f/u   • Palpitations       Active Problems:  Problem List Items Addressed This Visit        Cardiac and Vasculature    Hypertension    Overview     3. Hypertension  3.1 Echo 11/10/15 - mild LVH; EF > 65%; DDI; trace MR, mild TR, PA 20-25         Palpitations    Overview     5. Chronic palpitations likely secondary to anxiety and stress.         Heart murmur       Symptoms and Signs    Atypical chest pain - Primary      Problem List:      1. Nonobstructive CAD per catheterization, 2011.  1.1 stress test 6/21: Negative for ischemia, anterior septal and apical defect most likely breast attenuation, post-rest EF 67%  2. Mild peripheral vascular disease per arterial duplex of the lower extremities, January 2012.  3. Hypertension  4. Dyslipidemia  5. Chronic palpitations likely secondary to anxiety and stress.  5.1 Event Monitor 10/21-11/3 - NSR      6. Smoking Habituation      7.  Chronic venous insufficiency    HPI  HPI  Ms. Jackson is a 50-year-old female patient who is being followed up today for chest pain and stress test follow-up.  Patient did undergo a nuclear stress test that did not show any signs of ischemia..  She still reports midsternal chest pain that occurs intermittently which is a shooting-like pain and afterwards was tender to the touch.  She says she is feels like this mainly associated with stress and she states tensed up.  This is unchanged from before.  He also has shortness of breath which she has a cough and wheezing which she contributes to allergies.  Also reports palpitations that occur intermittently which she has a fluttering and heart beating-like sensation in her chest.  She says it can occur 2-3 times together and several times a day then he can go for a while  without occurring again.  They occur at random and does have associated shortness of breath.  She describes this as having to take a deep breath.  She does report fatigue where she gets tired easily.  She also reports unchanged bilateral lower extremity edema.  She denies any syncope, dizziness, PND, orthopnea, or other strokelike symptoms.  We did review the stress test in detail she denied any questions.        Patient did not bring med list or medicine bottles to appointment, med list has been reviewed and updated based on patient's knowledge of their meds.   PRIOR MEDS  Current Outpatient Medications on File Prior to Visit   Medication Sig Dispense Refill   • busPIRone (BUSPAR) 5 MG tablet Take 5 mg by mouth 2 (two) times a day.     • cyanocobalamin 1000 MCG/ML injection INJECT 1 ML INTRAMUSCULARLY ONCE A MONTH     • cyclobenzaprine (FLEXERIL) 10 MG tablet Take  by mouth as needed.     • famotidine (PEPCID) 40 MG tablet Take 40 mg by mouth 2 (Two) Times a Day.     • fexofenadine (ALLEGRA ALLERGY) 180 MG tablet Take 1 tablet by mouth daily.     • FLUoxetine (PROzac) 40 MG capsule Take 40 mg by mouth Daily.     • gabapentin (NEURONTIN) 300 MG capsule Take 300 mg by mouth Tonight.  0   • HYDROcodone-acetaminophen (NORCO)  MG per tablet prn     • LORazepam (ATIVAN) 0.5 MG tablet Take 0.5 mg by mouth Daily.     • metoprolol tartrate (LOPRESSOR) 50 MG tablet Take 1 tablet by mouth Every 12 (Twelve) Hours. 180 tablet 2   • montelukast (SINGULAIR) 10 MG tablet Take 10 mg by mouth Every Night.     • nitroglycerin (NITROSTAT) 0.4 MG SL tablet Place  under the tongue. PLACE 1 TABLET UNDER THE TONGUE EVERY 5 MINUTES FOR UP TO 3 DOSES AS NEEDED FOR CHEST PAIN. CALL 911 IF PAIN PERSISTS.     • ondansetron (ZOFRAN) 8 MG tablet Take 8 mg by mouth Every 8 (Eight) Hours As Needed for Nausea or Vomiting.     • [DISCONTINUED] methocarbamol (ROBAXIN) 500 MG tablet Take 500 mg by mouth As Needed for Muscle Spasms.       No  current facility-administered medications on file prior to visit.       ALLERGIES  Amoxicillin and Codeine    HISTORY  Past Medical History:   Diagnosis Date   • Anxiety    • Anxiety    • Arthritis    • Asthma    • Atypical chest pain    • CAD (coronary artery disease)     2-vessel   • Chronic headaches    • Depression    • Dyslipidemia    • Dyspnea    • GERD (gastroesophageal reflux disease)    • H/O cardiac catheterization 2011   • H/O: osteoarthritis    • History of peripheral arterial disease    • Hyperlipidemia    • Hypertension    • Osteoarthritis    • Palpitations    • Peripheral artery disease (CMS/HCC)    • PTSD (post-traumatic stress disorder)    • Seasonal allergies    • Sternocleidomastoid muscle tenderness    • Vitamin B12 deficiency (non anemic)        Social History     Socioeconomic History   • Marital status:      Spouse name: Not on file   • Number of children: Not on file   • Years of education: Not on file   • Highest education level: Not on file   Tobacco Use   • Smoking status: Current Every Day Smoker     Packs/day: 0.50     Types: Cigarettes   • Smokeless tobacco: Never Used   • Tobacco comment: 7 cigarettes daily   Substance and Sexual Activity   • Alcohol use: No   • Drug use: No       Family History   Problem Relation Age of Onset   • Heart attack Father    • Other Father         CABG   • Hypertension Father    • Heart attack Paternal Grandfather    • No Known Problems Mother        Review of Systems   Constitutional: Positive for fatigue. Negative for chills, diaphoresis and fever.   Eyes: Negative.    Respiratory: Positive for cough and wheezing. Negative for apnea, chest tightness and shortness of breath (on exertion only).    Cardiovascular: Positive for chest pain (no more than before), palpitations (no more than before) and leg swelling (BLE).   Gastrointestinal: Negative.  Negative for blood in stool.   Endocrine: Negative.    Genitourinary: Negative.  Negative for hematuria.  "  Musculoskeletal: Positive for arthralgias, back pain, myalgias and neck pain.   Skin: Negative.    Allergic/Immunologic: Negative.    Neurological: Positive for numbness (toes and hands (carpal tunnel)) and headaches. Negative for dizziness, syncope, weakness and light-headedness.   Hematological: Negative.  Does not bruise/bleed easily.   Psychiatric/Behavioral: Negative.  Negative for sleep disturbance.       Objective     VITALS: /80 (BP Location: Left arm, Patient Position: Sitting)   Pulse 68   Ht 167.6 cm (65.98\")   Wt 107 kg (236 lb)   SpO2 95%   BMI 38.11 kg/m²     LABS:   Lab Results (most recent)     None          IMAGING:   No Images in the past 120 days found..    EXAM:  Physical Exam  Vitals and nursing note reviewed.   Constitutional:       Appearance: She is well-developed.   HENT:      Head: Normocephalic.   Eyes:      Pupils: Pupils are equal, round, and reactive to light.   Neck:      Thyroid: No thyroid mass.      Vascular: No JVD.      Trachea: Trachea and phonation normal.   Cardiovascular:      Rate and Rhythm: Normal rate and regular rhythm.      Pulses:           Radial pulses are 2+ on the right side and 2+ on the left side.        Posterior tibial pulses are 2+ on the right side and 2+ on the left side.      Heart sounds: Murmur heard.   No friction rub. No gallop.    Pulmonary:      Effort: Pulmonary effort is normal. No respiratory distress.      Breath sounds: Normal breath sounds. No wheezing or rales.   Abdominal:      General: Bowel sounds are normal.      Palpations: Abdomen is soft.   Musculoskeletal:         General: Swelling (1+ BLE) present. Normal range of motion.      Cervical back: Neck supple.   Skin:     General: Skin is warm and dry.      Capillary Refill: Capillary refill takes less than 2 seconds.      Findings: No rash.   Neurological:      Mental Status: She is alert and oriented to person, place, and time.   Psychiatric:         Speech: Speech normal.    "      Behavior: Behavior normal.         Thought Content: Thought content normal.         Judgment: Judgment normal.         Procedure   Procedures       Assessment/Plan    Diagnosis Plan   1. Atypical chest pain     2. Heart murmur     3. Palpitations     4. Essential hypertension     1.  Patient does continue to have atypical chest pain and shortness of breath despite negative nuclear stress test.  She feels like the majority of her symptoms are caused by stress or allergies.  We did discuss potentially moving forward with further ischemic evaluation which we will defer at this time.  2.  Patient's blood pressure is elevated today 140/80 heart rate is 68.  We will have her continue to monitor blood pressure on a routine basis for any significant highs or lows.  3.  Informed of signs and symptoms of ACS and advised to seek emergent treatment for any new worsening symptoms.  Patient also advised sooner follow-up as needed.  Also advised to follow-up with family doctor as needed  This note is dictated utilizing voice recognition software.  Although this record has been proof read, transcriptional errors may still be present. If questions occur regarding the content of this record please do not hesitate to call our office.  I have reviewed and confirmed the accuracy of the ROS as documented by the MA/LPN/RN ZOHREH Moralez    Return in about 3 months (around 11/25/2021), or if symptoms worsen or fail to improve.    Diagnoses and all orders for this visit:    1. Atypical chest pain (Primary)    2. Heart murmur    3. Palpitations    4. Essential hypertension               MEDS ORDERED DURING VISIT:  No orders of the defined types were placed in this encounter.          This document has been electronically signed by ZOHREH Moralez Jr.  August 25, 2021 12:43 EDT

## 2021-11-29 ENCOUNTER — OFFICE VISIT (OUTPATIENT)
Dept: CARDIOLOGY | Facility: CLINIC | Age: 51
End: 2021-11-29

## 2021-11-29 VITALS — HEART RATE: 61 BPM | DIASTOLIC BLOOD PRESSURE: 80 MMHG | OXYGEN SATURATION: 98 % | SYSTOLIC BLOOD PRESSURE: 141 MMHG

## 2021-11-29 DIAGNOSIS — R06.02 SHORTNESS OF BREATH: ICD-10-CM

## 2021-11-29 DIAGNOSIS — R00.2 PALPITATIONS: ICD-10-CM

## 2021-11-29 DIAGNOSIS — R07.89 ATYPICAL CHEST PAIN: Primary | ICD-10-CM

## 2021-11-29 DIAGNOSIS — I10 ESSENTIAL HYPERTENSION: ICD-10-CM

## 2021-11-29 PROCEDURE — 99213 OFFICE O/P EST LOW 20 MIN: CPT | Performed by: NURSE PRACTITIONER
